# Patient Record
Sex: FEMALE | Race: WHITE | NOT HISPANIC OR LATINO | Employment: OTHER
[De-identification: names, ages, dates, MRNs, and addresses within clinical notes are randomized per-mention and may not be internally consistent; named-entity substitution may affect disease eponyms.]

---

## 2017-01-10 ENCOUNTER — PRIOR ORIGINAL RECORDS (OUTPATIENT)
Dept: OTHER | Age: 82
End: 2017-01-10

## 2017-01-10 ENCOUNTER — MYAURORA ACCOUNT LINK (OUTPATIENT)
Dept: OTHER | Age: 82
End: 2017-01-10

## 2017-01-13 ENCOUNTER — MYAURORA ACCOUNT LINK (OUTPATIENT)
Dept: OTHER | Age: 82
End: 2017-01-13

## 2017-01-26 ENCOUNTER — PRIOR ORIGINAL RECORDS (OUTPATIENT)
Dept: OTHER | Age: 82
End: 2017-01-26

## 2017-01-30 RX ORDER — EZETIMIBE AND SIMVASTATIN 10; 10 MG/1; MG/1
TABLET ORAL
Qty: 36 TABLET | Refills: 0 | Status: SHIPPED | OUTPATIENT
Start: 2017-01-30 | End: 2017-05-09

## 2017-02-07 ENCOUNTER — PRIOR ORIGINAL RECORDS (OUTPATIENT)
Dept: OTHER | Age: 82
End: 2017-02-07

## 2017-04-24 RX ORDER — EZETIMIBE AND SIMVASTATIN 10; 10 MG/1; MG/1
TABLET ORAL
Qty: 36 TABLET | OUTPATIENT
Start: 2017-04-24

## 2017-05-09 ENCOUNTER — OFFICE VISIT (OUTPATIENT)
Dept: INTERNAL MEDICINE CLINIC | Facility: CLINIC | Age: 82
End: 2017-05-09

## 2017-05-09 ENCOUNTER — HOSPITAL ENCOUNTER (OUTPATIENT)
Dept: GENERAL RADIOLOGY | Age: 82
Discharge: HOME | End: 2017-05-09
Attending: INTERNAL MEDICINE
Payer: MEDICARE

## 2017-05-09 VITALS
RESPIRATION RATE: 13 BRPM | TEMPERATURE: 98 F | HEIGHT: 62 IN | DIASTOLIC BLOOD PRESSURE: 70 MMHG | WEIGHT: 130 LBS | BODY MASS INDEX: 23.92 KG/M2 | HEART RATE: 76 BPM | OXYGEN SATURATION: 97 % | SYSTOLIC BLOOD PRESSURE: 122 MMHG

## 2017-05-09 DIAGNOSIS — M25.562 CHRONIC PAIN OF BOTH KNEES: ICD-10-CM

## 2017-05-09 DIAGNOSIS — M25.561 CHRONIC PAIN OF BOTH KNEES: ICD-10-CM

## 2017-05-09 DIAGNOSIS — H35.371 MACULAR PUCKER, RIGHT EYE: ICD-10-CM

## 2017-05-09 DIAGNOSIS — G89.29 CHRONIC PAIN OF BOTH KNEES: ICD-10-CM

## 2017-05-09 DIAGNOSIS — S06.5X9A SDH (SUBDURAL HEMATOMA) (HCC): ICD-10-CM

## 2017-05-09 DIAGNOSIS — E78.2 MIXED HYPERLIPIDEMIA: ICD-10-CM

## 2017-05-09 DIAGNOSIS — I10 ESSENTIAL HYPERTENSION: ICD-10-CM

## 2017-05-09 DIAGNOSIS — Z00.00 ENCOUNTER FOR ANNUAL HEALTH EXAMINATION: ICD-10-CM

## 2017-05-09 DIAGNOSIS — Z13.31 DEPRESSION SCREENING: ICD-10-CM

## 2017-05-09 DIAGNOSIS — Z00.00 ENCOUNTER FOR ANNUAL HEALTH EXAMINATION: Primary | ICD-10-CM

## 2017-05-09 DIAGNOSIS — K59.09 CHRONIC CONSTIPATION: ICD-10-CM

## 2017-05-09 DIAGNOSIS — I25.10 CORONARY ARTERY DISEASE INVOLVING NATIVE CORONARY ARTERY OF NATIVE HEART WITHOUT ANGINA PECTORIS: ICD-10-CM

## 2017-05-09 PROBLEM — S06.5XAA SDH (SUBDURAL HEMATOMA) (HCC): Status: ACTIVE | Noted: 2017-05-09

## 2017-05-09 PROCEDURE — 73565 X-RAY EXAM OF KNEES: CPT | Performed by: INTERNAL MEDICINE

## 2017-05-09 PROCEDURE — 99214 OFFICE O/P EST MOD 30 MIN: CPT | Performed by: INTERNAL MEDICINE

## 2017-05-09 PROCEDURE — G0439 PPPS, SUBSEQ VISIT: HCPCS | Performed by: INTERNAL MEDICINE

## 2017-05-09 PROCEDURE — G0444 DEPRESSION SCREEN ANNUAL: HCPCS | Performed by: INTERNAL MEDICINE

## 2017-05-09 RX ORDER — MELATONIN
1000 DAILY
COMMUNITY
End: 2020-12-04

## 2017-05-09 RX ORDER — OXYBUTYNIN CHLORIDE 5 MG/1
5 TABLET, EXTENDED RELEASE ORAL DAILY
Qty: 90 TABLET | Refills: 0 | Status: SHIPPED | OUTPATIENT
Start: 2017-05-09 | End: 2017-10-02

## 2017-05-09 RX ORDER — OXYBUTYNIN CHLORIDE 5 MG/1
5 TABLET, EXTENDED RELEASE ORAL DAILY
Qty: 90 TABLET | Refills: 0 | Status: SHIPPED | OUTPATIENT
Start: 2017-05-09 | End: 2017-05-09

## 2017-05-09 RX ORDER — LOSARTAN POTASSIUM 25 MG/1
25 TABLET ORAL EVERY MORNING
Status: ON HOLD | COMMUNITY
End: 2021-02-17

## 2017-05-09 NOTE — PROGRESS NOTES
HPI:   Michele Husain is a 80year old female who presents for a medicare wellness exam and additional issues as noted below. 1. CADz s/p PCI x 2: she is adamant Dr. Bibiana Velasquez told her NOT to be on ASA 81 mg daily and vytorin.  I showed her cardiology n Samuel Breaux MD.   MEDICAL INFORMATION:   She  has a past medical history of Hypercholesterolemia; Retinal tear (1983); Floaters (2010); Epiretinal membrane (2010); Other and unspecified hyperlipidemia; [facility] (2012);  High blood pressure; Cataract; anemia  ENDOCRINE: denies thyroid history  ALL/ASTHMA: denies hx of allergy or asthma    EXAM:   /70 mmHg  Pulse 76  Temp(Src) 97.7 °F (36.5 °C) (Oral)  Resp 13  Ht 62\"  Wt 130 lb  BMI 23.77 kg/m2  SpO2 97% Estimated body mass index is 23.77 kg/(m^2 General Health     In the past six months, have you lost more than 10 pounds without trying?: 2 - No    Has your appetite been poor?: No    How does the patient maintain a good energy level?: Appropriate Exercise    How would you describe your daily ph Directives     Do you have a healthcare power of ?: Yes    Do you have a living will?: Yes     Please go to \"Cognitive Assessment\" under Medicare Assessment section in Charting, test patient and document.     Then, refresh your progress note to se display for this patient. Update Health Maintenance if applicable    Chlamydia  Annually if high risk No results found for: CHLAMYDIA No flowsheet data found.     Screening Mammogram      Mammogram Annually to 76, then as discussed Mammogram,1 Yr due on 02/ VALP No flowsheet data found. Diabetes      HgbA1C  Annually HGBA1C (%)   Date Value   01/29/2016 5.7*   05/16/2012 6.0*    No flowsheet data found.     Creat/alb ratio  Annually      LDL  Annually LDL CHOLESTEROL (mg/dL)   Date Value   11/15/2016 75 POA)    Care Team:  Bethany Knight    The patient indicates understanding of these issues and agrees to the plan.   The patient is asked to return in May, 2018 for medicare wellness exam.    Jm Muhammad MD

## 2017-05-09 NOTE — PATIENT INSTRUCTIONS
Dr. Victoriano Louise (your cardiologist) WANTS you to be on Aspirin 81 mg once daily and continue the vytorin 3 times/week. I recommend you take the losartan 50 mg once every morning.      For your urinary incontinence, please STOP the detrol LA and start oxybuty bed.  STAIRS:  ? Keep stairwells well lit with light switches at top and bottom. ? Install sturdy handrails on both sides. ? Make sure carpeting is secure. FLOORS:  ? Remove all loose wires, cords and throw rugs. ? Keep floors clear of clutter. ?  Make

## 2017-08-04 ENCOUNTER — PRIOR ORIGINAL RECORDS (OUTPATIENT)
Dept: OTHER | Age: 82
End: 2017-08-04

## 2017-08-30 ENCOUNTER — OFFICE VISIT (OUTPATIENT)
Dept: OPHTHALMOLOGY | Facility: CLINIC | Age: 82
End: 2017-08-30

## 2017-08-30 DIAGNOSIS — Z96.1 PSEUDOPHAKIA OF BOTH EYES: ICD-10-CM

## 2017-08-30 DIAGNOSIS — H35.371 EPIRETINAL MEMBRANE, RIGHT EYE: Primary | ICD-10-CM

## 2017-08-30 DIAGNOSIS — H43.393 VITREOUS FLOATERS OF BOTH EYES: ICD-10-CM

## 2017-08-30 PROCEDURE — 92014 COMPRE OPH EXAM EST PT 1/>: CPT | Performed by: OPHTHALMOLOGY

## 2017-08-30 PROCEDURE — 92015 DETERMINE REFRACTIVE STATE: CPT | Performed by: OPHTHALMOLOGY

## 2017-08-30 NOTE — PROGRESS NOTES
Jerry Padilla is a 80year old female. HPI:     HPI     Pt complains of blurred vision OU at near when reading fine print. Pt would like an updated glasses Rx.      Last edited by Madison Myles O.T. on 8/30/2017  9:48 AM. (History)        Patient 20/30 -2    Near cc 20/30 20/25    Correction:  Glasses          Tonometry (Applanation, 10:01 AM)       Right Left    Pressure 12 13          Pupils       Pupils    Right PERRL    Left PERRL          Visual Fields       Left Right     Full Full          E Visit:    No prescriptions requested or ordered in this encounter     Follow up instructions:  Return in about 1 year (around 8/30/2018) for Complete eye exam.    8/30/2017  Scribed by: Peg Elizalde MD

## 2017-08-30 NOTE — PATIENT INSTRUCTIONS
Pseudophakia of both eyes  No treatment. New glasses today; suggest update. Vitreous floaters of both eyes  No treatment required. Epiretinal membrane, right eye  Stable, no treatment required. Will follow.

## 2017-09-13 ENCOUNTER — PRIOR ORIGINAL RECORDS (OUTPATIENT)
Dept: OTHER | Age: 82
End: 2017-09-13

## 2017-09-13 ENCOUNTER — OFFICE VISIT (OUTPATIENT)
Dept: INTERNAL MEDICINE CLINIC | Facility: CLINIC | Age: 82
End: 2017-09-13

## 2017-09-13 VITALS
WEIGHT: 131 LBS | TEMPERATURE: 98 F | HEIGHT: 62 IN | OXYGEN SATURATION: 97 % | DIASTOLIC BLOOD PRESSURE: 70 MMHG | RESPIRATION RATE: 16 BRPM | BODY MASS INDEX: 24.11 KG/M2 | HEART RATE: 71 BPM | SYSTOLIC BLOOD PRESSURE: 150 MMHG

## 2017-09-13 DIAGNOSIS — M79.662 BILATERAL CALF PAIN: ICD-10-CM

## 2017-09-13 DIAGNOSIS — I10 ESSENTIAL HYPERTENSION: ICD-10-CM

## 2017-09-13 DIAGNOSIS — M79.661 BILATERAL CALF PAIN: ICD-10-CM

## 2017-09-13 DIAGNOSIS — I25.10 CORONARY ARTERY DISEASE INVOLVING NATIVE CORONARY ARTERY OF NATIVE HEART WITHOUT ANGINA PECTORIS: ICD-10-CM

## 2017-09-13 DIAGNOSIS — R60.0 BILATERAL LOWER EXTREMITY EDEMA: Primary | ICD-10-CM

## 2017-09-13 DIAGNOSIS — E78.2 MIXED HYPERLIPIDEMIA: ICD-10-CM

## 2017-09-13 PROCEDURE — 99214 OFFICE O/P EST MOD 30 MIN: CPT | Performed by: INTERNAL MEDICINE

## 2017-09-13 RX ORDER — FUROSEMIDE 20 MG/1
20 TABLET ORAL DAILY
Qty: 30 TABLET | Refills: 0 | Status: SHIPPED | OUTPATIENT
Start: 2017-09-13 | End: 2018-05-07

## 2017-09-13 NOTE — PROGRESS NOTES
Lilibeth Willett is a 80year old female. HPI:   Patient presents with:  Swelling: both legs  Patient presents with bilateral leg swelling. It has been going on for past few weeks, but it worsened over past few days.   She was standing a lot for her so lens implant (Right, 6/5/00); Cataract extraction w/  intraocular lens implant (Left, 6/30/02); angioplasty (coronary) (2012); tonsillectomy; and cataract.   Family: family history includes Heart Disorder in her mother; Heart Disorder (age of onset: 61) in exertion, but no active anginal symptoms. Continue to monitor. Patient Care Team:  James Powell MD as PCP - General (Internal Medicine)  The patient indicates understanding of these issues and agrees to the plan.   The patient is asked to return to

## 2017-09-13 NOTE — PATIENT INSTRUCTIONS
- Take 1 tablet of the furosemide (water pill) every morning. Take this for the next 1-2 weeks. - Schedule the ultrasound of your legs; this will show us if you have any blood clots. - Keep your legs elevated as much as possible.   - Ok to continue r

## 2017-09-18 ENCOUNTER — HOSPITAL ENCOUNTER (OUTPATIENT)
Dept: ULTRASOUND IMAGING | Age: 82
Discharge: HOME | End: 2017-09-18
Attending: INTERNAL MEDICINE
Payer: MEDICARE

## 2017-09-18 DIAGNOSIS — R60.0 BILATERAL LOWER EXTREMITY EDEMA: ICD-10-CM

## 2017-09-18 DIAGNOSIS — M79.661 BILATERAL CALF PAIN: ICD-10-CM

## 2017-09-18 DIAGNOSIS — M79.662 BILATERAL CALF PAIN: ICD-10-CM

## 2017-09-18 PROCEDURE — 93970 EXTREMITY STUDY: CPT | Performed by: INTERNAL MEDICINE

## 2017-09-29 ENCOUNTER — PRIOR ORIGINAL RECORDS (OUTPATIENT)
Dept: OTHER | Age: 82
End: 2017-09-29

## 2017-10-02 DIAGNOSIS — M25.561 CHRONIC PAIN OF BOTH KNEES: ICD-10-CM

## 2017-10-02 DIAGNOSIS — M25.562 CHRONIC PAIN OF BOTH KNEES: ICD-10-CM

## 2017-10-02 DIAGNOSIS — Z00.00 ENCOUNTER FOR ANNUAL HEALTH EXAMINATION: ICD-10-CM

## 2017-10-02 DIAGNOSIS — G89.29 CHRONIC PAIN OF BOTH KNEES: ICD-10-CM

## 2017-10-02 DIAGNOSIS — Z13.31 DEPRESSION SCREENING: ICD-10-CM

## 2017-10-03 RX ORDER — OXYBUTYNIN CHLORIDE 5 MG/1
TABLET, EXTENDED RELEASE ORAL
Qty: 90 TABLET | Refills: 2 | Status: SHIPPED | OUTPATIENT
Start: 2017-10-03 | End: 2018-01-04

## 2017-10-08 ENCOUNTER — HOSPITAL ENCOUNTER (EMERGENCY)
Facility: HOSPITAL | Age: 82
Discharge: HOME | End: 2017-10-08
Attending: EMERGENCY MEDICINE
Payer: MEDICARE

## 2017-10-08 ENCOUNTER — PRIOR ORIGINAL RECORDS (OUTPATIENT)
Dept: OTHER | Age: 82
End: 2017-10-08

## 2017-10-08 ENCOUNTER — APPOINTMENT (OUTPATIENT)
Dept: GENERAL RADIOLOGY | Facility: HOSPITAL | Age: 82
End: 2017-10-08
Payer: MEDICARE

## 2017-10-08 VITALS
TEMPERATURE: 98 F | BODY MASS INDEX: 24.55 KG/M2 | WEIGHT: 130 LBS | OXYGEN SATURATION: 98 % | DIASTOLIC BLOOD PRESSURE: 88 MMHG | RESPIRATION RATE: 18 BRPM | HEART RATE: 75 BPM | SYSTOLIC BLOOD PRESSURE: 187 MMHG | HEIGHT: 61 IN

## 2017-10-08 DIAGNOSIS — I10 HYPERTENSION, UNSPECIFIED TYPE: Primary | ICD-10-CM

## 2017-10-08 PROCEDURE — 36415 COLL VENOUS BLD VENIPUNCTURE: CPT

## 2017-10-08 PROCEDURE — 80053 COMPREHEN METABOLIC PANEL: CPT

## 2017-10-08 PROCEDURE — 85025 COMPLETE CBC W/AUTO DIFF WBC: CPT | Performed by: EMERGENCY MEDICINE

## 2017-10-08 PROCEDURE — 85025 COMPLETE CBC W/AUTO DIFF WBC: CPT

## 2017-10-08 PROCEDURE — 99285 EMERGENCY DEPT VISIT HI MDM: CPT

## 2017-10-08 PROCEDURE — 93005 ELECTROCARDIOGRAM TRACING: CPT

## 2017-10-08 PROCEDURE — 80053 COMPREHEN METABOLIC PANEL: CPT | Performed by: EMERGENCY MEDICINE

## 2017-10-08 PROCEDURE — 93010 ELECTROCARDIOGRAM REPORT: CPT

## 2017-10-08 PROCEDURE — 71010 XR CHEST AP PORTABLE  (CPT=71010): CPT

## 2017-10-08 PROCEDURE — 71010 XR CHEST AP PORTABLE  (CPT=71010): CPT | Performed by: EMERGENCY MEDICINE

## 2017-10-08 PROCEDURE — 84484 ASSAY OF TROPONIN QUANT: CPT | Performed by: EMERGENCY MEDICINE

## 2017-10-08 PROCEDURE — 84484 ASSAY OF TROPONIN QUANT: CPT

## 2017-10-08 RX ORDER — LOSARTAN POTASSIUM 50 MG/1
50 TABLET ORAL 2 TIMES DAILY
Qty: 60 TABLET | Refills: 0 | Status: SHIPPED | OUTPATIENT
Start: 2017-10-08 | End: 2017-11-07

## 2017-10-08 RX ORDER — EZETIMIBE AND SIMVASTATIN 10; 10 MG/1; MG/1
1 TABLET ORAL NIGHTLY
COMMUNITY
End: 2018-05-07

## 2017-10-08 NOTE — ED INITIAL ASSESSMENT (HPI)
Pt to ER c/o shortness of breath and high blood pressure today. Pt states she has tired but can't sleep.

## 2017-10-09 NOTE — ED PROVIDER NOTES
Patient Seen in: [facility] Emergency Department    History   Patient presents with:  Hypertension (cardiovascular)    Stated Complaint: hypertension    HPI    59-year-old female presents with elevated blood pressure.   Patient reports she was at [facility] date: TONSILLECTOMY    Family History   Problem Relation Age of Onset   • Heart Disorder Mother    • Heart Disorder Son 61     MI    • Diabetes Neg    • Glaucoma Neg    • Macular degeneration Neg        Smoking status: Never Smoker Status                     ---------                               -----------         ------                     CBC W/ DIFFERENTIAL[[id number]]          Normal              Final result                 Please view results for these tests on the individ TECHNIQUE:  AP chest radiograph was obtained. COMPARISON:  [facility] , BERKLEY CHEST AP PORTABLE  (CPT=71010), 11/14/2016, 23:24.   INDICATIONS:  hypertension  PATIENT STATED HISTORY: (As transcribed by Technologist)  Hypertension, dyspnea and lower chest tightnes Impression:  Hypertension, unspecified type  (primary encounter diagnosis)    Disposition:  Discharge    Follow-up:  Nirmal Velarde MD  [address] [facility] [phone]    Schedule an appointment as soon as possible for a

## 2017-10-13 ENCOUNTER — MYAURORA ACCOUNT LINK (OUTPATIENT)
Dept: OTHER | Age: 82
End: 2017-10-13

## 2017-10-13 ENCOUNTER — PRIOR ORIGINAL RECORDS (OUTPATIENT)
Dept: OTHER | Age: 82
End: 2017-10-13

## 2017-10-24 LAB
ALBUMIN: 3.4 G/DL
ALKALINE PHOSPHATATE(ALK PHOS): 83 IU/L
BILIRUBIN TOTAL: 0.4 MG/DL
BUN: 18 MG/DL
CALCIUM: 8.7 MG/DL
CHLORIDE: 104 MEQ/L
CREATININE, SERUM: 0.74 MG/DL
GLUCOSE: 153 MG/DL
HEMATOCRIT: 38.8 %
HEMOGLOBIN: 13 G/DL
PLATELETS: 236 K/UL
POTASSIUM, SERUM: 3.8 MEQ/L
PROTEIN, TOTAL: 6.3 G/DL
RED BLOOD COUNT: 4.3 X 10-6/U
SGOT (AST): 17 IU/L
SGPT (ALT): 20 IU/L
SODIUM: 137 MEQ/L
WHITE BLOOD COUNT: 6.4 X 10-3/U

## 2017-10-31 ENCOUNTER — PRIOR ORIGINAL RECORDS (OUTPATIENT)
Dept: OTHER | Age: 82
End: 2017-10-31

## 2017-11-06 ENCOUNTER — HOSPITAL ENCOUNTER (OUTPATIENT)
Age: 82
Discharge: HOME | End: 2017-11-06
Attending: FAMILY MEDICINE
Payer: MEDICARE

## 2017-11-06 ENCOUNTER — APPOINTMENT (OUTPATIENT)
Dept: CT IMAGING | Age: 82
End: 2017-11-06
Attending: FAMILY MEDICINE
Payer: MEDICARE

## 2017-11-06 VITALS
SYSTOLIC BLOOD PRESSURE: 168 MMHG | RESPIRATION RATE: 16 BRPM | HEART RATE: 88 BPM | OXYGEN SATURATION: 97 % | TEMPERATURE: 99 F | DIASTOLIC BLOOD PRESSURE: 82 MMHG

## 2017-11-06 DIAGNOSIS — S00.83XA CONTUSION OF OTHER PART OF HEAD, INITIAL ENCOUNTER: Primary | ICD-10-CM

## 2017-11-06 DIAGNOSIS — S80.01XA CONTUSION OF RIGHT KNEE, INITIAL ENCOUNTER: ICD-10-CM

## 2017-11-06 PROCEDURE — 99214 OFFICE O/P EST MOD 30 MIN: CPT

## 2017-11-06 PROCEDURE — 99204 OFFICE O/P NEW MOD 45 MIN: CPT

## 2017-11-06 PROCEDURE — 70450 CT HEAD/BRAIN W/O DYE: CPT | Performed by: FAMILY MEDICINE

## 2017-11-06 NOTE — ED INITIAL ASSESSMENT (HPI)
Here for evaluation of head injury that occurred about 2pm. Sts that she had fallen in her kitchen when she was turning around. Denies any LOC. Hematoma noted to head and inner lip lac noted.

## 2017-11-07 NOTE — ED PROVIDER NOTES
Patient Seen in: [facility]    History   Patient presents with:  Fall (musculoskeletal, neurologic)    Stated Complaint: fall     HPI    80year old female presents for head injury after mechanical fall.  States around 2 pm, she except as noted above. PSFH elements reviewed from today and agreed except as otherwise stated in HPI.     Physical Exam   ED Triage Vitals [11/06/17 1754]  BP: (!) 199/74  Pulse: 88  Resp: 16  Temp: 99.1 °F (37.3 °C)  Temp src: Temporal  SpO2: 97 %  O2 global volume loss is overall age-appropriate. INTRACRANIAL:  Overall mild periventricular hypoattenuation is present. This is stable to slightly progressed from the prior exam. No significant midline shift or mass effect.  SINUSES:           No sign of acu

## 2017-11-09 ENCOUNTER — OFFICE VISIT (OUTPATIENT)
Dept: INTERNAL MEDICINE CLINIC | Facility: CLINIC | Age: 82
End: 2017-11-09

## 2017-11-09 VITALS
HEIGHT: 62 IN | OXYGEN SATURATION: 97 % | RESPIRATION RATE: 13 BRPM | DIASTOLIC BLOOD PRESSURE: 82 MMHG | WEIGHT: 130 LBS | BODY MASS INDEX: 23.92 KG/M2 | HEART RATE: 70 BPM | TEMPERATURE: 98 F | SYSTOLIC BLOOD PRESSURE: 142 MMHG

## 2017-11-09 DIAGNOSIS — S00.83XA CONTUSION OF FACE, INITIAL ENCOUNTER: ICD-10-CM

## 2017-11-09 DIAGNOSIS — E78.2 MIXED HYPERLIPIDEMIA: ICD-10-CM

## 2017-11-09 DIAGNOSIS — Z91.81 STATUS POST FALL: Primary | ICD-10-CM

## 2017-11-09 DIAGNOSIS — Z96.1 PSEUDOPHAKIA OF BOTH EYES: ICD-10-CM

## 2017-11-09 DIAGNOSIS — I10 ESSENTIAL HYPERTENSION: ICD-10-CM

## 2017-11-09 PROCEDURE — 99214 OFFICE O/P EST MOD 30 MIN: CPT | Performed by: INTERNAL MEDICINE

## 2017-11-09 NOTE — PROGRESS NOTES
Gracy Moe is a 80year old female. HPI:   Patient presents with: Follow - Up  Fall  Patient presents for follow up from recent immediate care visit (11/6/17).   She was wearing athletic shoes, turned around quickly in the kitchen, foot slipped on (2010); Floaters (2010); Heart attack (2012); High blood pressure; Hypercholesterolemia; Muscle weakness; Osteoarthritis; Other and unspecified hyperlipidemia; and Retinal tear (1983).   Surgical:  has a past surgical history that includes Cataract extracti syndrome. CT head done at immediate care did not show any acute findings (patient did have a subdural hematoma in 2012). Ecchymosis on face is slowly re-absorbing. Continue to monitor.       3.  Essential hypertension  Stable, at goal.  Continue losartan

## 2017-11-09 NOTE — PATIENT INSTRUCTIONS
- Let us know if you have severe headaches, vision problems, nausea/vomiting  - Your bruising should heal/re-absorb within the next few weeks. - Use Tylenol and ice packs as needed. It was a pleasure seeing you in the clinic today.   Thank you for kizzy

## 2018-01-04 DIAGNOSIS — Z13.31 DEPRESSION SCREENING: ICD-10-CM

## 2018-01-04 DIAGNOSIS — Z00.00 ENCOUNTER FOR ANNUAL HEALTH EXAMINATION: ICD-10-CM

## 2018-01-04 DIAGNOSIS — M25.562 CHRONIC PAIN OF BOTH KNEES: ICD-10-CM

## 2018-01-04 DIAGNOSIS — G89.29 CHRONIC PAIN OF BOTH KNEES: ICD-10-CM

## 2018-01-04 DIAGNOSIS — M25.561 CHRONIC PAIN OF BOTH KNEES: ICD-10-CM

## 2018-01-04 RX ORDER — OXYBUTYNIN CHLORIDE 5 MG/1
5 TABLET, EXTENDED RELEASE ORAL
Qty: 90 TABLET | Refills: 1 | Status: SHIPPED | OUTPATIENT
Start: 2018-01-04 | End: 2018-08-29

## 2018-01-04 NOTE — TELEPHONE ENCOUNTER
Patient calling to request refill for OXYBUTYNIN CHLORIDE ER 5 Mg.  Patient needs It sent to [facility] in [place] and also needs 3 month supply

## 2018-02-06 ENCOUNTER — PRIOR ORIGINAL RECORDS (OUTPATIENT)
Dept: OTHER | Age: 83
End: 2018-02-06

## 2018-02-06 ENCOUNTER — MYAURORA ACCOUNT LINK (OUTPATIENT)
Dept: OTHER | Age: 83
End: 2018-02-06

## 2018-02-22 ENCOUNTER — PRIOR ORIGINAL RECORDS (OUTPATIENT)
Dept: OTHER | Age: 83
End: 2018-02-22

## 2018-02-23 ENCOUNTER — PRIOR ORIGINAL RECORDS (OUTPATIENT)
Dept: OTHER | Age: 83
End: 2018-02-23

## 2018-03-05 ENCOUNTER — PRIOR ORIGINAL RECORDS (OUTPATIENT)
Dept: OTHER | Age: 83
End: 2018-03-05

## 2018-03-07 ENCOUNTER — PRIOR ORIGINAL RECORDS (OUTPATIENT)
Dept: OTHER | Age: 83
End: 2018-03-07

## 2018-04-03 ENCOUNTER — PRIOR ORIGINAL RECORDS (OUTPATIENT)
Dept: OTHER | Age: 83
End: 2018-04-03

## 2018-04-04 ENCOUNTER — PRIOR ORIGINAL RECORDS (OUTPATIENT)
Dept: OTHER | Age: 83
End: 2018-04-04

## 2018-04-18 ENCOUNTER — TELEPHONE (OUTPATIENT)
Dept: INTERNAL MEDICINE CLINIC | Facility: CLINIC | Age: 83
End: 2018-04-18

## 2018-05-07 ENCOUNTER — OFFICE VISIT (OUTPATIENT)
Dept: INTERNAL MEDICINE CLINIC | Facility: CLINIC | Age: 83
End: 2018-05-07

## 2018-05-07 VITALS
SYSTOLIC BLOOD PRESSURE: 128 MMHG | HEIGHT: 60 IN | HEART RATE: 70 BPM | DIASTOLIC BLOOD PRESSURE: 86 MMHG | TEMPERATURE: 98 F | BODY MASS INDEX: 25.97 KG/M2 | WEIGHT: 132.25 LBS | RESPIRATION RATE: 20 BRPM

## 2018-05-07 DIAGNOSIS — E78.2 MIXED HYPERLIPIDEMIA: ICD-10-CM

## 2018-05-07 DIAGNOSIS — H35.371 EPIRETINAL MEMBRANE, RIGHT EYE: ICD-10-CM

## 2018-05-07 DIAGNOSIS — Z00.00 MEDICARE ANNUAL WELLNESS VISIT, SUBSEQUENT: Primary | ICD-10-CM

## 2018-05-07 DIAGNOSIS — M25.472 BILATERAL SWELLING OF FEET AND ANKLES: ICD-10-CM

## 2018-05-07 DIAGNOSIS — R32 URINARY INCONTINENCE, UNSPECIFIED TYPE: ICD-10-CM

## 2018-05-07 DIAGNOSIS — M25.475 BILATERAL SWELLING OF FEET AND ANKLES: ICD-10-CM

## 2018-05-07 DIAGNOSIS — H43.393 VITREOUS FLOATERS OF BOTH EYES: ICD-10-CM

## 2018-05-07 DIAGNOSIS — M25.474 BILATERAL SWELLING OF FEET AND ANKLES: ICD-10-CM

## 2018-05-07 DIAGNOSIS — M17.0 BILATERAL PRIMARY OSTEOARTHRITIS OF KNEE: ICD-10-CM

## 2018-05-07 DIAGNOSIS — I10 ESSENTIAL HYPERTENSION: ICD-10-CM

## 2018-05-07 DIAGNOSIS — Z96.1 PSEUDOPHAKIA OF BOTH EYES: ICD-10-CM

## 2018-05-07 DIAGNOSIS — I25.10 CORONARY ARTERY DISEASE INVOLVING NATIVE CORONARY ARTERY OF NATIVE HEART WITHOUT ANGINA PECTORIS: ICD-10-CM

## 2018-05-07 DIAGNOSIS — M25.471 BILATERAL SWELLING OF FEET AND ANKLES: ICD-10-CM

## 2018-05-07 PROBLEM — S06.5X9A SDH (SUBDURAL HEMATOMA) (HCC): Status: RESOLVED | Noted: 2017-05-09 | Resolved: 2018-05-07

## 2018-05-07 PROBLEM — S06.5XAA SDH (SUBDURAL HEMATOMA) (HCC): Status: RESOLVED | Noted: 2017-05-09 | Resolved: 2018-05-07

## 2018-05-07 PROCEDURE — G0439 PPPS, SUBSEQ VISIT: HCPCS | Performed by: INTERNAL MEDICINE

## 2018-05-07 PROCEDURE — 99214 OFFICE O/P EST MOD 30 MIN: CPT | Performed by: INTERNAL MEDICINE

## 2018-05-07 NOTE — PROGRESS NOTES
HPI:   Danny Fletcher is a 80year old female who presents for a Medicare Subsequent Annual Wellness visit (Pt already had Initial Annual Wellness).   Annual Physical due on 05/09/2018   Preventative health/wellness examination - up to date as far as immun She has Vision problems based on screening of functional status. Vision Problems? : Yes (glasses)         She has problems with Memory based on screening of functional status.    Memory Problems?: Yes       Depression Screening (PHQ-2/PHQ-9): Over the Lab Results  Component Value Date   AST 17 10/08/2017   ALT 20 10/08/2017   CA 8.7 10/08/2017   ALB 3.4 (L) 10/08/2017   TSH 1.380 10/01/2013   CREATSERUM 0.74 10/08/2017    (H) 10/08/2017        CBC  (most recent labs)     Lab Results  Componen headaches  PSYCHE: denies depression or anxiety  HEMATOLOGIC: denies hx of anemia  ENDOCRINE: denies thyroid history  ALL/ASTHMA: denies hx of allergy or asthma  EXAM:   /86 (BP Location: Left arm, Patient Position: Sitting, Cuff Size: adult)   Pulse be put on medications. 4/5. Coronary artery disease involving native coronary artery of native heart without angina pectoris, bilateral swelling of feet and ankles  As far as CAD, she is asymptomatic, no anginal symptoms. Declines asa as above.   On This section provided for quick review of chart, separate sheet to patient  [address] Internal Lab or Procedure External Lab or Procedure   Diabetes Screening      HbgA1C   Annually   Lab Results  Component Value Date Influenza  Covered Annually  Please get every year    Pneumococcal 13 (Prevnar)  Covered Once after 65 No vaccine history found Please get once after your 65th birthday    Pneumococcal 23 (Pneumovax)  Covered Once after 65 No vaccine history found Jameson

## 2018-05-07 NOTE — PATIENT INSTRUCTIONS
- We will contact you when we are ready for you to schedule the appointment for the knee injections  - If you continue to have blurry vision with your glasses on, follow up with Dr. Flor Powell; otherwise he wanted to see you in August.    - We will get recor

## 2018-05-08 PROBLEM — M17.0 BILATERAL PRIMARY OSTEOARTHRITIS OF KNEE: Status: ACTIVE | Noted: 2018-05-08

## 2018-05-08 PROBLEM — R32 URINARY INCONTINENCE: Status: ACTIVE | Noted: 2018-05-08

## 2018-05-14 ENCOUNTER — TELEPHONE (OUTPATIENT)
Dept: INTERNAL MEDICINE CLINIC | Facility: CLINIC | Age: 83
End: 2018-05-14

## 2018-05-16 ENCOUNTER — PRIOR ORIGINAL RECORDS (OUTPATIENT)
Dept: OTHER | Age: 83
End: 2018-05-16

## 2018-05-16 ENCOUNTER — TELEPHONE (OUTPATIENT)
Dept: INTERNAL MEDICINE CLINIC | Facility: CLINIC | Age: 83
End: 2018-05-16

## 2018-05-16 NOTE — TELEPHONE ENCOUNTER
Patient was interested in seeing Briana Dahl for steroid injections in knees. Had bilateral x-rays in 2017. Knee pain worsening. Please contact patient when ready to schedule appointment with Beatriz/when cortisone is available in office.

## 2018-05-21 ENCOUNTER — LABORATORY ENCOUNTER (OUTPATIENT)
Dept: LAB | Age: 83
End: 2018-05-21
Attending: INTERNAL MEDICINE
Payer: MEDICARE

## 2018-05-21 ENCOUNTER — OFFICE VISIT (OUTPATIENT)
Dept: INTERNAL MEDICINE CLINIC | Facility: CLINIC | Age: 83
End: 2018-05-21

## 2018-05-21 VITALS
OXYGEN SATURATION: 95 % | DIASTOLIC BLOOD PRESSURE: 86 MMHG | WEIGHT: 131 LBS | BODY MASS INDEX: 25.72 KG/M2 | HEART RATE: 102 BPM | RESPIRATION RATE: 18 BRPM | TEMPERATURE: 98 F | HEIGHT: 60 IN | SYSTOLIC BLOOD PRESSURE: 124 MMHG

## 2018-05-21 DIAGNOSIS — I10 ESSENTIAL HYPERTENSION: ICD-10-CM

## 2018-05-21 DIAGNOSIS — E78.2 MIXED HYPERLIPIDEMIA: ICD-10-CM

## 2018-05-21 DIAGNOSIS — I25.10 CORONARY ARTERY DISEASE INVOLVING NATIVE CORONARY ARTERY OF NATIVE HEART WITHOUT ANGINA PECTORIS: ICD-10-CM

## 2018-05-21 DIAGNOSIS — M17.0 BILATERAL PRIMARY OSTEOARTHRITIS OF KNEE: Primary | ICD-10-CM

## 2018-05-21 PROCEDURE — 99213 OFFICE O/P EST LOW 20 MIN: CPT | Performed by: PHYSICIAN ASSISTANT

## 2018-05-21 PROCEDURE — 80061 LIPID PANEL: CPT

## 2018-05-21 PROCEDURE — 80053 COMPREHEN METABOLIC PANEL: CPT

## 2018-05-21 PROCEDURE — 36415 COLL VENOUS BLD VENIPUNCTURE: CPT

## 2018-05-21 PROCEDURE — 20610 DRAIN/INJ JOINT/BURSA W/O US: CPT | Performed by: PHYSICIAN ASSISTANT

## 2018-05-21 PROCEDURE — 85025 COMPLETE CBC W/AUTO DIFF WBC: CPT

## 2018-05-21 RX ORDER — TRIAMCINOLONE ACETONIDE 40 MG/ML
40 INJECTION, SUSPENSION INTRA-ARTICULAR; INTRAMUSCULAR ONCE
Status: COMPLETED | OUTPATIENT
Start: 2018-05-21 | End: 2018-05-21

## 2018-05-21 RX ORDER — LOSARTAN POTASSIUM 25 MG/1
50 TABLET ORAL NIGHTLY
Status: ON HOLD | COMMUNITY
Start: 2018-05-08 | End: 2021-02-17

## 2018-05-21 RX ADMIN — TRIAMCINOLONE ACETONIDE 40 MG: 40 INJECTION, SUSPENSION INTRA-ARTICULAR; INTRAMUSCULAR at 11:24:00

## 2018-05-21 RX ADMIN — TRIAMCINOLONE ACETONIDE 40 MG: 40 INJECTION, SUSPENSION INTRA-ARTICULAR; INTRAMUSCULAR at 11:25:00

## 2018-05-21 NOTE — PATIENT INSTRUCTIONS
Corticosteroid (“Cortisone”) Knee Injections  Instructions Following Injections    *you may try over the counter topical agents such as IcyHot, Salonpas, Biofreeze as needed.     The Steroid  Corticosteroids work by reducing inflammation directly in the [employer] notify the office right away. If you experience any reaction to the rest of your body such as swelling, rashes, chest tightness, shortness of breath, difficulty breathing you should seek medical attention immediately. [facility] * [address]

## 2018-05-21 NOTE — PROGRESS NOTES
Jalyn Chawla is a 80year old female. HPI:   Patient presents for bilat knee pain x several years. Pain over the anterior aspect of the knees. No recent injury but she has had several falls over the years which tend to flare up her knee pain.   Nadine Flower +PF/DF. DP & PT pulses present bilaterally. ASSESSMENT AND PLAN:   # Bilat knee OA: discussed the use of ice, tylenol, activity modification, corticosteroid vs hyaluronic acid injections, and HEP program vs formal PT.   Pt wishes to pursue bilat cortico

## 2018-05-23 ENCOUNTER — TELEPHONE (OUTPATIENT)
Dept: INTERNAL MEDICINE CLINIC | Facility: CLINIC | Age: 83
End: 2018-05-23

## 2018-05-23 ENCOUNTER — PRIOR ORIGINAL RECORDS (OUTPATIENT)
Dept: OTHER | Age: 83
End: 2018-05-23

## 2018-05-23 RX ORDER — FUROSEMIDE 20 MG/1
20 TABLET ORAL DAILY
Qty: 30 TABLET | Refills: 0 | Status: ON HOLD | OUTPATIENT
Start: 2018-05-23 | End: 2018-08-15

## 2018-05-23 NOTE — TELEPHONE ENCOUNTER
Spoke to Preeti re: st for lasix, since kidney function ok , st would be ok monitor b/p see test result

## 2018-05-23 NOTE — TELEPHONE ENCOUNTER
If patient is still having leg swelling - I can send in a short-term prescription for furosemide (20 mg tablets).   Would recommend taking this for 2-4 weeks at most.

## 2018-05-27 ENCOUNTER — APPOINTMENT (OUTPATIENT)
Dept: GENERAL RADIOLOGY | Age: 83
End: 2018-05-27
Attending: FAMILY MEDICINE
Payer: MEDICARE

## 2018-05-27 ENCOUNTER — HOSPITAL ENCOUNTER (OUTPATIENT)
Age: 83
Discharge: HOME | End: 2018-05-27
Attending: FAMILY MEDICINE
Payer: MEDICARE

## 2018-05-27 ENCOUNTER — APPOINTMENT (OUTPATIENT)
Dept: CT IMAGING | Age: 83
End: 2018-05-27
Attending: FAMILY MEDICINE
Payer: MEDICARE

## 2018-05-27 VITALS
RESPIRATION RATE: 20 BRPM | HEART RATE: 67 BPM | DIASTOLIC BLOOD PRESSURE: 64 MMHG | SYSTOLIC BLOOD PRESSURE: 152 MMHG | OXYGEN SATURATION: 98 %

## 2018-05-27 DIAGNOSIS — S89.91XA INJURY OF RIGHT KNEE, INITIAL ENCOUNTER: ICD-10-CM

## 2018-05-27 DIAGNOSIS — S00.03XA HEMATOMA OF SCALP, INITIAL ENCOUNTER: ICD-10-CM
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
## 2018-05-27 DIAGNOSIS — S40.011A CONTUSION OF RIGHT SHOULDER, INITIAL ENCOUNTER: ICD-10-CM

## 2018-05-27 DIAGNOSIS — S60.221A CONTUSION OF RIGHT HAND, INITIAL ENCOUNTER: ICD-10-CM

## 2018-05-27 DIAGNOSIS — S09.90XA CLOSED HEAD INJURY, INITIAL ENCOUNTER: ICD-10-CM

## 2018-05-27 PROCEDURE — 84484 ASSAY OF TROPONIN QUANT: CPT

## 2018-05-27 PROCEDURE — 93005 ELECTROCARDIOGRAM TRACING: CPT

## 2018-05-27 PROCEDURE — 93010 ELECTROCARDIOGRAM REPORT: CPT

## 2018-05-27 PROCEDURE — 73030 X-RAY EXAM OF SHOULDER: CPT | Performed by: FAMILY MEDICINE

## 2018-05-27 PROCEDURE — 36415 COLL VENOUS BLD VENIPUNCTURE: CPT

## 2018-05-27 PROCEDURE — 99215 OFFICE O/P EST HI 40 MIN: CPT

## 2018-05-27 PROCEDURE — 73560 X-RAY EXAM OF KNEE 1 OR 2: CPT | Performed by: FAMILY MEDICINE

## 2018-05-27 PROCEDURE — 70450 CT HEAD/BRAIN W/O DYE: CPT | Performed by: FAMILY MEDICINE

## 2018-05-27 PROCEDURE — 73130 X-RAY EXAM OF HAND: CPT | Performed by: FAMILY MEDICINE

## 2018-05-27 NOTE — ED PROVIDER NOTES
Patient Seen in: [address] [facility]    History   Patient presents with:  Fall    Stated Complaint: RIGHT HAND SWOLLEN/PT Irene Vicente    HPI  71-year-old female presents to the immediate care today with her daughter chief Diagnosis Date   • Cataract    • Floaters 2010    per ng OD   • Heart attack ([address] 2012    PCI x 2   • Muscle weakness    • Osteoarthritis     bilat knees   • Retinal tear 1983    per ng OD retinal tear repair with Dr. Doris Richard at [facility]       Past Surgical H normal. No drainage or sinus tenderness. No signs of epistaxis or dried blood in nares.  No nasal bone tenderness  Throat: lips, mucosa, and tongue normal; teeth and gums normal  Neck: no adenopathy, supple, symmetrical, trachea midline and thyroid not enla EKG    Rate, intervals and axes as noted on EKG Report. Rate: 68  Rhythm: Sinus Rhythm  Reading: Normal sinus rhythm. Normal EKG.            Xr Knee (1 Or 2 Views), Right (cpt=73560)    Result Date: 5/27/2018  PROCEDURE:  XR KNEE (1 OR 2 VIEWS), RIGHT by: John Jimenez MD on 5/27/2018 at 11:23     Approved by: John Jimenez MD            Xr Shoulder, Complete (min 2 Views), Right (cpt=73030)    Result Date: 5/27/2018  PROCEDURE:  XR SHOULDER, COMPLETE (MIN 2 VIEWS), RIGHT (CPT=73030)     TECHNIQUE: no acute intracranial hemorrhage or extra-axial fluid collection. No evidence of acute territorial infarction. Stable mild to moderate chronic microvascular ischemic changes in the cerebral white matter. Stable old lacunar infarcts in the basal ganglia.

## 2018-05-27 NOTE — ED INITIAL ASSESSMENT (HPI)
C/O right hand pain and head injury that occurred while walking out to get the mail last night. Sts that she fell on the stairs outside of her house. Unsure of any LOC.

## 2018-08-14 ENCOUNTER — APPOINTMENT (OUTPATIENT)
Dept: GENERAL RADIOLOGY | Facility: HOSPITAL | Age: 83
End: 2018-08-14
Attending: EMERGENCY MEDICINE
Payer: MEDICARE

## 2018-08-14 ENCOUNTER — HOSPITAL ENCOUNTER (OUTPATIENT)
Facility: HOSPITAL | Age: 83
Setting detail: OBSERVATION
Discharge: HOME | End: 2018-08-15
Attending: EMERGENCY MEDICINE | Admitting: HOSPITALIST
Payer: MEDICARE

## 2018-08-14 ENCOUNTER — APPOINTMENT (OUTPATIENT)
Dept: CT IMAGING | Facility: HOSPITAL | Age: 83
End: 2018-08-14
Attending: EMERGENCY MEDICINE
Payer: MEDICARE

## 2018-08-14 ENCOUNTER — APPOINTMENT (OUTPATIENT)
Dept: ULTRASOUND IMAGING | Facility: HOSPITAL | Age: 83
End: 2018-08-14
Attending: INTERNAL MEDICINE
Payer: MEDICARE

## 2018-08-14 ENCOUNTER — APPOINTMENT (OUTPATIENT)
Dept: CV DIAGNOSTICS | Facility: HOSPITAL | Age: 83
End: 2018-08-14
Attending: INTERNAL MEDICINE
Payer: MEDICARE

## 2018-08-14 DIAGNOSIS — S00.03XA CONTUSION OF SCALP, INITIAL ENCOUNTER: ICD-10-CM

## 2018-08-14 DIAGNOSIS — R55 SYNCOPE AND COLLAPSE: Primary | ICD-10-CM

## 2018-08-14 PROBLEM — I77.9 CAROTID DISEASE, BILATERAL (HCC): Status: ACTIVE | Noted: 2018-08-14
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
## 2018-08-14 LAB
ALBUMIN SERPL-MCNC: 3.6 G/DL (ref 3.5–4.8)
ALBUMIN/GLOB SERPL: 1.3 {RATIO} (ref 1–2)
ALP LIVER SERPL-CCNC: 81 U/L (ref 55–142)
ALT SERPL-CCNC: 17 U/L (ref 14–54)
ANION GAP SERPL CALC-SCNC: 9 MMOL/L (ref 0–18)
AST SERPL-CCNC: 17 U/L (ref 15–41)
ATRIAL RATE: 70 BPM
BASOPHILS # BLD AUTO: 0.03 X10(3) UL (ref 0–0.1)
BASOPHILS NFR BLD AUTO: 0.5 %
BILIRUB SERPL-MCNC: 0.6 MG/DL (ref 0.1–2)
BILIRUB UR QL STRIP.AUTO: NEGATIVE
BUN BLD-MCNC: 15 MG/DL (ref 8–20)
BUN/CREAT SERPL: 17.4 (ref 10–20)
CALCIUM BLD-MCNC: 8.8 MG/DL (ref 8.3–10.3)
CHLORIDE SERPL-SCNC: 102 MMOL/L (ref 101–111)
CLARITY UR REFRACT.AUTO: CLEAR
CO2 SERPL-SCNC: 28 MMOL/L (ref 22–32)
COLOR UR AUTO: YELLOW
CREAT BLD-MCNC: 0.86 MG/DL (ref 0.55–1.02)
EOSINOPHIL # BLD AUTO: 0.13 X10(3) UL (ref 0–0.3)
EOSINOPHIL NFR BLD AUTO: 2 %
ERYTHROCYTE [DISTWIDTH] IN BLOOD BY AUTOMATED COUNT: 13.2 % (ref 11.5–16)
GLOBULIN PLAS-MCNC: 2.7 G/DL (ref 2.5–3.7)
GLUCOSE BLD-MCNC: 121 MG/DL (ref 70–99)
GLUCOSE UR STRIP.AUTO-MCNC: NEGATIVE MG/DL
HCT VFR BLD AUTO: 39.7 % (ref 34–50)
HGB BLD-MCNC: 13.3 G/DL (ref 12–16)
IMMATURE GRANULOCYTE COUNT: 0.01 X10(3) UL (ref 0–1)
IMMATURE GRANULOCYTE RATIO %: 0.2 %
INR BLD: 1.01 (ref 0.9–1.1)
KETONES UR STRIP.AUTO-MCNC: NEGATIVE MG/DL
LEUKOCYTE ESTERASE UR QL STRIP.AUTO: NEGATIVE
LYMPHOCYTES # BLD AUTO: 3.04 X10(3) UL (ref 0.9–4)
LYMPHOCYTES NFR BLD AUTO: 47 %
M PROTEIN MFR SERPL ELPH: 6.3 G/DL (ref 6.1–8.3)
MCH RBC QN AUTO: 30.4 PG (ref 27–33.2)
MCHC RBC AUTO-ENTMCNC: 33.5 G/DL (ref 31–37)
MCV RBC AUTO: 90.6 FL (ref 81–100)
MONOCYTES # BLD AUTO: 0.53 X10(3) UL (ref 0.1–1)
MONOCYTES NFR BLD AUTO: 8.2 %
NEUTROPHIL ABS PRELIM: 2.73 X10 (3) UL (ref 1.3–6.7)
NEUTROPHILS # BLD AUTO: 2.73 X10(3) UL (ref 1.3–6.7)
NEUTROPHILS NFR BLD AUTO: 42.1 %
NITRITE UR QL STRIP.AUTO: NEGATIVE
OSMOLALITY SERPL CALC.SUM OF ELEC: 290 MOSM/KG (ref 275–295)
P AXIS: 58 DEGREES
P-R INTERVAL: 164 MS
PH UR STRIP.AUTO: 7 [PH] (ref 4.5–8)
PLATELET # BLD AUTO: 246 10(3)UL (ref 150–450)
POTASSIUM SERPL-SCNC: 3.7 MMOL/L (ref 3.6–5.1)
PROT UR STRIP.AUTO-MCNC: NEGATIVE MG/DL
PSA SERPL DL<=0.01 NG/ML-MCNC: 13.1 SECONDS (ref 12–14.1)
Q-T INTERVAL: 410 MS
QRS DURATION: 88 MS
QTC CALCULATION (BEZET): 442 MS
R AXIS: 24 DEGREES
RBC # BLD AUTO: 4.38 X10(6)UL (ref 3.8–5.1)
RBC UR QL AUTO: NEGATIVE
RED CELL DISTRIBUTION WIDTH-SD: 44.2 FL (ref 35.1–46.3)
SODIUM SERPL-SCNC: 139 MMOL/L (ref 136–144)
SP GR UR STRIP.AUTO: 1.01 (ref 1–1.03)
T AXIS: 74 DEGREES
TROPONIN I SERPL-MCNC: <0.046 NG/ML (ref ?–0.05)
UROBILINOGEN UR STRIP.AUTO-MCNC: <2 MG/DL
VENTRICULAR RATE: 70 BPM
WBC # BLD AUTO: 6.5 X10(3) UL (ref 4–13)

## 2018-08-14 PROCEDURE — 72125 CT NECK SPINE W/O DYE: CPT | Performed by: EMERGENCY MEDICINE

## 2018-08-14 PROCEDURE — 93880 EXTRACRANIAL BILAT STUDY: CPT | Performed by: INTERNAL MEDICINE

## 2018-08-14 PROCEDURE — 70450 CT HEAD/BRAIN W/O DYE: CPT | Performed by: EMERGENCY MEDICINE

## 2018-08-14 PROCEDURE — 71045 X-RAY EXAM CHEST 1 VIEW: CPT | Performed by: EMERGENCY MEDICINE

## 2018-08-14 PROCEDURE — 99220 INITIAL OBSERVATION CARE,LEVL III: CPT | Performed by: HOSPITALIST

## 2018-08-14 PROCEDURE — 99204 OFFICE O/P NEW MOD 45 MIN: CPT | Performed by: OTHER

## 2018-08-14 RX ORDER — SODIUM CHLORIDE 9 MG/ML
125 INJECTION, SOLUTION INTRAVENOUS CONTINUOUS
Status: DISCONTINUED | OUTPATIENT
Start: 2018-08-14 | End: 2018-08-15

## 2018-08-14 RX ORDER — ENOXAPARIN SODIUM 100 MG/ML
40 INJECTION SUBCUTANEOUS DAILY
Status: DISCONTINUED | OUTPATIENT
Start: 2018-08-14 | End: 2018-08-15

## 2018-08-14 RX ORDER — ONDANSETRON 2 MG/ML
4 INJECTION INTRAMUSCULAR; INTRAVENOUS EVERY 6 HOURS PRN
Status: DISCONTINUED | OUTPATIENT
Start: 2018-08-14 | End: 2018-08-15

## 2018-08-14 RX ORDER — LOSARTAN POTASSIUM 25 MG/1
25 TABLET ORAL DAILY
Status: DISCONTINUED | OUTPATIENT
Start: 2018-08-14 | End: 2018-08-15

## 2018-08-14 RX ORDER — LOSARTAN POTASSIUM 50 MG/1
50 TABLET ORAL NIGHTLY
Status: DISCONTINUED | OUTPATIENT
Start: 2018-08-14 | End: 2018-08-14

## 2018-08-14 RX ORDER — HYDRALAZINE HYDROCHLORIDE 20 MG/ML
10 INJECTION INTRAMUSCULAR; INTRAVENOUS EVERY 4 HOURS PRN
Status: DISCONTINUED | OUTPATIENT
Start: 2018-08-14 | End: 2018-08-15

## 2018-08-14 RX ORDER — LOSARTAN POTASSIUM 50 MG/1
50 TABLET ORAL NIGHTLY
Status: DISCONTINUED | OUTPATIENT
Start: 2018-08-14 | End: 2018-08-15

## 2018-08-14 RX ORDER — ACETAMINOPHEN 500 MG
1000 TABLET ORAL ONCE
Status: COMPLETED | OUTPATIENT
Start: 2018-08-14 | End: 2018-08-14

## 2018-08-14 RX ORDER — POTASSIUM CHLORIDE 20 MEQ/1
40 TABLET, EXTENDED RELEASE ORAL ONCE
Status: COMPLETED | OUTPATIENT
Start: 2018-08-14 | End: 2018-08-14

## 2018-08-14 RX ORDER — ACETAMINOPHEN 325 MG/1
650 TABLET ORAL EVERY 6 HOURS PRN
Status: DISCONTINUED | OUTPATIENT
Start: 2018-08-14 | End: 2018-08-15

## 2018-08-14 RX ORDER — SODIUM CHLORIDE 9 MG/ML
INJECTION, SOLUTION INTRAVENOUS CONTINUOUS
Status: DISCONTINUED | OUTPATIENT
Start: 2018-08-14 | End: 2018-08-15

## 2018-08-14 NOTE — PROGRESS NOTES
NURSING ADMISSION NOTE      Patient admitted via Cart, from ER. Pt is A&O X3, not in any distress, on RA, SR per tele, bp is running high, orthostatic v/s is done, due bp meds given, held ivf at present until cards see the pt. Oriented to room.   Safet

## 2018-08-14 NOTE — CONSULTS
[facility]  Cardiology Consultation    Edy Izquierdo Patient Status:  Observation    1928 MRN [id number]   [facility] 8NE-A Attending Brigette Huerta MD   Hosp Day # 0 PCP Justin Saenz MD     Reason for Consultation:   Fall, p Oral, Daily  •  Losartan Potassium (COZAAR) tab 50 mg, 50 mg, Oral, Nightly  •  0.9%  NaCl infusion, , Intravenous, Continuous  •  Enoxaparin Sodium (LOVENOX) 40 MG/0.4ML injection 40 mg, 40 mg, Subcutaneous, Daily  •  acetaminophen (TYLENOL) tab 650 mg, 6 Cxr: normal    Impression:  Principal Problem:    Syncope and collapse: versus unsteadiness/dizziness with fall, and loc after head trauma  Active Problems:    Coronary artery disease: no angina    HTN (hypertension)    Contusion of scalp, initial encounte

## 2018-08-14 NOTE — H&P
[facility] HOSPITAL[facility]  History and Physical     Gracy Moe Patient Status:  Observation    1928 MRN [id number]   [facility] 8NE-A Attending Anabel Jhaveri MD   Hosp Day # 0 PCP Hellen Merino MD     Chief Complaint: Syncope    His Prescriptions on File Prior to Encounter:  furosemide 20 MG Oral Tab Take 1 tablet (20 mg total) by mouth daily. Disp: 30 tablet Rfl: 0   Losartan Potassium 50 MG Oral Tab Take 50 mg by mouth nightly.    Disp:  Rfl:    Oxybutynin Chloride ER 5 MG Oral Table of 0.86 mg/dL). Recent Labs   Lab  08/14/18   0857   PTP  13.1   INR  1.01       Recent Labs   Lab  08/14/18   0857   TROP  <0.046       Imaging: Imaging data reviewed in Epic. ASSESSMENT / PLAN:     1.  Syncope: Etiology unclear, telemetry monitori

## 2018-08-14 NOTE — ED INITIAL ASSESSMENT (HPI)
Patient was standing at sink this morning, felt weak and fell backward. She has contusion on posterior scalp. States she may have passed out.

## 2018-08-15 ENCOUNTER — APPOINTMENT (OUTPATIENT)
Dept: CV DIAGNOSTICS | Facility: HOSPITAL | Age: 83
End: 2018-08-15
Attending: INTERNAL MEDICINE
Payer: MEDICARE

## 2018-08-15 VITALS
DIASTOLIC BLOOD PRESSURE: 61 MMHG | TEMPERATURE: 98 F | RESPIRATION RATE: 18 BRPM | HEART RATE: 83 BPM | WEIGHT: 127.44 LBS | HEIGHT: 61 IN | BODY MASS INDEX: 24.06 KG/M2 | SYSTOLIC BLOOD PRESSURE: 143 MMHG | OXYGEN SATURATION: 97 %

## 2018-08-15 LAB — POTASSIUM SERPL-SCNC: 3.7 MMOL/L (ref 3.6–5.1)

## 2018-08-15 PROCEDURE — 99217 OBSERVATION CARE DISCHARGE: CPT | Performed by: HOSPITALIST

## 2018-08-15 PROCEDURE — 93306 TTE W/DOPPLER COMPLETE: CPT | Performed by: INTERNAL MEDICINE

## 2018-08-15 RX ORDER — POTASSIUM CHLORIDE 20 MEQ/1
40 TABLET, EXTENDED RELEASE ORAL ONCE
Status: COMPLETED | OUTPATIENT
Start: 2018-08-15 | End: 2018-08-15

## 2018-08-15 NOTE — PHYSICAL THERAPY NOTE
PHYSICAL THERAPY QUICK EVALUATION - INPATIENT    Room Number: 7469/2075-P  Evaluation Date: 8/15/2018  Presenting Problem: Syncope and collapse  Physician Order: PT Eval and Treat    Problem List  Principal Problem:    Syncope and collapse  Active Problems (denies pain at rest, back of head tender to touch)  Location: back of head (site of trauma)  Management Techniques: Relaxation   RANGE OF MOTION AND STRENGTH ASSESSMENT  Upper extremity ROM and strength are within functional limits     Lower extremity ROM standing up from a chair with arms (e.g., wheelchair, bedside commode, etc.): None   -   Moving from lying on back to sitting on the side of the bed?: None   How much help from another person does the patient currently need. ..   -   Moving to and from a be recommend OUTPATIENT PT upon discharge for higher level balance training to reduce fall risk. Recommendations discussed with the pt and pt declines outpatient therapy. Pt educated on fall risk and benefits of therapy.   Pt reports that she attended Nikki Arias

## 2018-08-15 NOTE — PROGRESS NOTES
08/15/18 0817   Clinical Encounter Type   Referral To (Referral made to [employer] for [address] communion as requested.   Chaplai to remain available at pager [phone].)   Confucianism Encounters   Spiritual Requests During Visit / Hospitalization Monae

## 2018-08-15 NOTE — PROGRESS NOTES
Discharge instructions given w/ printed avs-patient verbalized understanding  No lasix med upon discharge per lee worthington

## 2018-08-15 NOTE — CONSULTS
[facility]    Report of Consultation    Allison Lin Patient Status:  Observation    1928 MRN [id number]   [facility] 8NE-A Attending Rex Gonzalez MD   Hosp Day # 0 PCP Keila Haile MD     Date of Admission:  2018 alcohol per week . She reports that she does not use drugs.     Allergies:    Atorvastatin            MYALGIA  Sulfa Antibiotics       RASH    Comment:Other reaction(s): SULFA (SULFONAMIDE ANTIBIOTICS)    Medications:    Current Facility-Administered Medica K 3.7 08/14/2018    08/14/2018   CO2 28.0 08/14/2018   BUN 15 08/14/2018   CREATSERUM 0.86 08/14/2018    08/14/2018   CA 8.8 08/14/2018   ALKPHO 81 08/14/2018   ALT 17 08/14/2018   AST 17 08/14/2018   BILT 0.6 08/14/2018   ALB 3.6 08/14/2018 stenosis. Will sign off. Please with any questions.     Thank you for allowing me to participate in the evaluation of your patient,    Philip Hand,   Neuromuscular and General Neurology  [facility]   pager [phone]

## 2018-08-15 NOTE — OCCUPATIONAL THERAPY NOTE
OCCUPATIONAL THERAPY QUICK EVALUATION - INPATIENT    Room Number: 1474/1518-L  Evaluation Date: 8/15/2018     Type of Evaluation: Quick Eval  Presenting Problem: Syncope and Collapse    Physician Order: IP Consult to Occupational Therapy  Reason for [facility] 19-year-old female presents emergency room for evaluation after she had a fall/syncopal episode.   Patient states she was in her kitchen this morning, had had a small breakfast and was drinking some coffee, states that she stood up to use the phone while sh History of Present Illness: Viviane Chun is a 80year old female with past medical history of coronary artery disease status post stent placement presents to the emergency department with a syncopal event.   Patient was standing up answering her telephon Precautions: None  Fall Risk: High fall risk    WEIGHT BEARING RESTRICTION  Weight Bearing Restriction: None                PAIN ASSESSMENT  Ratin          COGNITION  No issues noted, able to make needs known    RANGE OF MOTION AND STRENGTH ASSESSMENT Skilled Therapy Provided: Pt presents up in room with PCT. Pt education on Role of OT, POC, D/C plan, Energy Conservation Techniques, Home Safety/Fall Prevention with patient asking questions related to home/community/lesiure situations.  Pt/therapist prob Patient able to toilet transfer: safely and independently  Patient able to dress lower extremities: safely and independently  Patient/Caregiver able to demonstrate safety with ADLS: safely and independently

## 2018-08-15 NOTE — PROGRESS NOTES
[facility]  Cardiology Progress Note    Subjective:  Feels better. B/p better. Objective:  /75 (BP Location: Left arm)   Pulse 94   Temp 98 °F (36.7 °C) (Oral)   Resp 18   Ht 5' 1\" (1.549 m)   Wt 127 lb 6.8 oz (57.8 kg)   SpO2 94%   BMI 24.

## 2018-08-15 NOTE — PROGRESS NOTES
[facility]  Progress Note     Artemio Vigil Patient Status:  Observation    1928 MRN [id number]   [facility] 8NE-A Attending Adelina Wilde MD   Hosp Day # 0 PCP Kaela Holt MD     Chief Complaint: Syncope    S: Patient w DC home if echo is negative    Quality:  · DVT Prophylaxis: Lovenox  · CODE status: Full Code  · [place]: None  · Central line: None    Estimated date of discharge: Today  Discharge is dependent on: Echo  At this point Ms. Mimi Cazares is expected to be discharge t

## 2018-08-15 NOTE — PROGRESS NOTES
Discharge instructions given w/ printed avs-verbalized understanding  Discharge home w/ belongings accompanied by transporter

## 2018-08-16 NOTE — DISCHARGE SUMMARY
[facility]OSPITAL[facility]  DISCHARGE SUMMARY     Khushi Fitzgerald Patient Status:  Observation    1928 MRN [id number]   [facility] 8NE-A Attending No att. providers found   Hosp Day # 0 PCP Ced Clements MD     Date of Admission: 2018 Oxybutynin Chloride ER 5 MG Tb24  Commonly known as:  DITROPAN-XL      Take 1 tablet (5 mg total) by mouth once daily. Quantity:  90 tablet  Refills:  1     Vitamin B-12 1000 MCG Tabs  Commonly known as:  VITAMIN B12      Take 1,000 mcg by mouth daily.

## 2018-08-17 ENCOUNTER — PATIENT OUTREACH (OUTPATIENT)
Dept: CASE MANAGEMENT | Age: 83
End: 2018-08-17

## 2018-08-17 DIAGNOSIS — Z02.9 ENCOUNTERS FOR ADMINISTRATIVE PURPOSE: ICD-10-CM

## 2018-08-17 NOTE — PROGRESS NOTES
Initial Post Discharge Follow Up   Discharge Date: 8/15/18  Contact Date: 8/17/2018    Consent Verification:  Assessment Completed With: Patient  HIPAA Verified?   Yes    Discharge Dx:  Syncope and collapse    General:   • How have you been since your [facility] anything? yes  • Are there any reasons that keep you from taking your medication as prescribed? No  Are you having any concerns with constipation? No    Referrals/orders at D/C:  Home Health ordered at D/C? No    DME ordered at D/C? No    Services ordered a

## 2018-08-29 ENCOUNTER — OFFICE VISIT (OUTPATIENT)
Dept: INTERNAL MEDICINE CLINIC | Facility: CLINIC | Age: 83
End: 2018-08-29
Payer: MEDICARE

## 2018-08-29 VITALS
HEART RATE: 77 BPM | OXYGEN SATURATION: 98 % | RESPIRATION RATE: 12 BRPM | DIASTOLIC BLOOD PRESSURE: 74 MMHG | WEIGHT: 131.5 LBS | TEMPERATURE: 98 F | SYSTOLIC BLOOD PRESSURE: 144 MMHG | BODY MASS INDEX: 25 KG/M2

## 2018-08-29 DIAGNOSIS — R32 URINARY INCONTINENCE, UNSPECIFIED TYPE: ICD-10-CM

## 2018-08-29 DIAGNOSIS — M17.0 BILATERAL PRIMARY OSTEOARTHRITIS OF KNEE: ICD-10-CM

## 2018-08-29 DIAGNOSIS — I10 ESSENTIAL HYPERTENSION: ICD-10-CM

## 2018-08-29 DIAGNOSIS — R55 SYNCOPE, UNSPECIFIED SYNCOPE TYPE: Primary | ICD-10-CM

## 2018-08-29 DIAGNOSIS — K59.09 CHRONIC CONSTIPATION: ICD-10-CM

## 2018-08-29 DIAGNOSIS — I65.23 BILATERAL CAROTID ARTERY STENOSIS: ICD-10-CM
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
## 2018-08-29 PROBLEM — S00.03XA CONTUSION OF SCALP, INITIAL ENCOUNTER: Status: RESOLVED | Noted: 2018-08-14 | Resolved: 2018-08-29

## 2018-08-29 PROCEDURE — 1111F DSCHRG MED/CURRENT MED MERGE: CPT | Performed by: INTERNAL MEDICINE

## 2018-08-29 PROCEDURE — 99495 TRANSJ CARE MGMT MOD F2F 14D: CPT | Performed by: INTERNAL MEDICINE

## 2018-08-29 NOTE — PATIENT INSTRUCTIONS
- Follow up with the heart clinic  - Schedule appointment at the hospital for getting the 30 day heart monitor  - For constipation, you can try over the counter options such as Miralax (polyethylene glycol) or Senna/Senokot (Senna-docusate).   - Make sure t

## 2018-08-29 NOTE — PROGRESS NOTES
HPI:    Brigida Munguia is a 80year old female here today for hospital follow up.    She was discharged from Inpatient hospital, [facility] to Home   Admission Date: 8/14/18   Discharge Date: 8/15/18  Hospital Discharge Diagnoses (since 7/30/2018) she came back from the hospital.    Blood pressure borderline today in the office. Has been running a little higher at home.     She was found to have bilateral carotid artery stenosis on dopplers in the hospital.    Was advised to stop furosemide, patient weakness  PSYCHE: denies depression or anxiety  HEMATOLOGIC: denies hx of anemia, or bruising, denies bleeding  ENDOCRINE: denies thyroid history  ALL/ASTHMA: denies hx of allergy or asthma    PHYSICAL EXAM:   No LMP recorded.  Patient is postmenopausal.  E per patient though I see no documentation of this. May have been to avoid orthostatic hypotension. Will hold furosemide for now. 3.  Urinary incontinence, unspecified type  Oxybutynin no longer helping.   Patient would like to discontinue this medicati

## 2018-09-04 ENCOUNTER — PRIOR ORIGINAL RECORDS (OUTPATIENT)
Dept: OTHER | Age: 83
End: 2018-09-04

## 2018-09-14 ENCOUNTER — PRIOR ORIGINAL RECORDS (OUTPATIENT)
Dept: OTHER | Age: 83
End: 2018-09-14

## 2018-09-18 ENCOUNTER — HOSPITAL ENCOUNTER (OUTPATIENT)
Dept: CV DIAGNOSTICS | Facility: HOSPITAL | Age: 83
Discharge: HOME | End: 2018-09-18
Attending: INTERNAL MEDICINE
Payer: MEDICARE

## 2018-09-18 DIAGNOSIS — R55 SYNCOPE, UNSPECIFIED SYNCOPE TYPE: ICD-10-CM

## 2018-09-18 PROCEDURE — 93272 ECG/REVIEW INTERPRET ONLY: CPT | Performed by: INTERNAL MEDICINE

## 2018-09-18 PROCEDURE — 93271 ECG/MONITORING AND ANALYSIS: CPT | Performed by: INTERNAL MEDICINE

## 2018-09-18 PROCEDURE — 93270 REMOTE 30 DAY ECG REV/REPORT: CPT | Performed by: INTERNAL MEDICINE

## 2018-11-02 ENCOUNTER — TELEPHONE (OUTPATIENT)
Dept: INTERNAL MEDICINE CLINIC | Facility: CLINIC | Age: 83
End: 2018-11-02

## 2018-11-02 NOTE — TELEPHONE ENCOUNTER
Spoke to pts son (on Communication waiver) rg 30 day even monitor done on 9/18/2018. Pts son verbalized understanding results will discuss with pt.

## 2018-11-02 NOTE — TELEPHONE ENCOUNTER
Pt son received a message and was returning our call. Caller not sure what call was about. Please call pt son back.

## 2018-11-16 ENCOUNTER — TELEPHONE (OUTPATIENT)
Dept: INTERNAL MEDICINE CLINIC | Facility: CLINIC | Age: 83
End: 2018-11-16

## 2018-11-19 ENCOUNTER — OFFICE VISIT (OUTPATIENT)
Dept: INTERNAL MEDICINE CLINIC | Facility: CLINIC | Age: 83
End: 2018-11-19
Payer: MEDICARE

## 2018-11-19 VITALS
BODY MASS INDEX: 26.01 KG/M2 | HEIGHT: 60 IN | RESPIRATION RATE: 12 BRPM | HEART RATE: 68 BPM | WEIGHT: 132.5 LBS | DIASTOLIC BLOOD PRESSURE: 78 MMHG | TEMPERATURE: 98 F | SYSTOLIC BLOOD PRESSURE: 120 MMHG

## 2018-11-19 DIAGNOSIS — M17.0 BILATERAL PRIMARY OSTEOARTHRITIS OF KNEE: Primary | ICD-10-CM

## 2018-11-19 PROCEDURE — 20610 DRAIN/INJ JOINT/BURSA W/O US: CPT | Performed by: PHYSICIAN ASSISTANT

## 2018-11-19 RX ORDER — TRIAMCINOLONE ACETONIDE 40 MG/ML
40 INJECTION, SUSPENSION INTRA-ARTICULAR; INTRAMUSCULAR ONCE
Status: COMPLETED | OUTPATIENT
Start: 2018-11-19 | End: 2018-11-19

## 2018-11-19 RX ADMIN — TRIAMCINOLONE ACETONIDE 40 MG: 40 INJECTION, SUSPENSION INTRA-ARTICULAR; INTRAMUSCULAR at 13:29:00

## 2018-11-19 RX ADMIN — TRIAMCINOLONE ACETONIDE 40 MG: 40 INJECTION, SUSPENSION INTRA-ARTICULAR; INTRAMUSCULAR at 13:26:00

## 2018-11-19 NOTE — PATIENT INSTRUCTIONS
Corticosteroid (“Cortisone”) Knee Injections  Instructions Following Injections    The Steroid  Corticosteroids work by reducing inflammation directly in the affected joint.   The steroid portion of the injection may take up to 2-3 days after your injection swelling, rashes, chest tightness, shortness of breath, difficulty breathing you should seek medical attention immediately. [facility] * [address] [facility] [address] [address] [facility] 400

## 2018-11-19 NOTE — PROGRESS NOTES
Bishop Beckett is a 80year old female. HPI:   Patient presents for f/u of bilat knee OA. Has had pain for several years. Denies recent injury.   Had cortisone shots done 5/21/18 which provided about nearly 100% relief for several weeks until she enrico Diffuse bony tenderness. Ligaments appear stable. NEUROVASCULAR: Bilateral LE strength 5/5. +PF/DF. DP & PT pulses present bilaterally.     ASSESSMENT AND PLAN:   # Bilat knee OA: discussed the use of ice, tylenol, activity modification, corticosteroid

## 2019-01-02 ENCOUNTER — TELEPHONE (OUTPATIENT)
Dept: INTERNAL MEDICINE CLINIC | Facility: CLINIC | Age: 84
End: 2019-01-02

## 2019-01-02 DIAGNOSIS — G89.29 CHRONIC PAIN OF BOTH KNEES: ICD-10-CM

## 2019-01-02 DIAGNOSIS — Z00.00 ENCOUNTER FOR ANNUAL HEALTH EXAMINATION: ICD-10-CM

## 2019-01-02 DIAGNOSIS — Z13.31 DEPRESSION SCREENING: ICD-10-CM

## 2019-01-02 DIAGNOSIS — R32 URINARY INCONTINENCE, UNSPECIFIED TYPE: Primary | ICD-10-CM

## 2019-01-02 DIAGNOSIS — M25.562 CHRONIC PAIN OF BOTH KNEES: ICD-10-CM

## 2019-01-02 DIAGNOSIS — M25.561 CHRONIC PAIN OF BOTH KNEES: ICD-10-CM

## 2019-01-02 RX ORDER — OXYBUTYNIN CHLORIDE 5 MG/1
5 TABLET, EXTENDED RELEASE ORAL
Qty: 90 TABLET | Refills: 1 | Status: SHIPPED | OUTPATIENT
Start: 2019-01-02 | End: 2019-07-29

## 2019-01-02 NOTE — TELEPHONE ENCOUNTER
Patient calling in seeking a refill for Oxybutynin Chloride ER 5 MG Oral Tablet 24 Hr     To be sent to:  [address], [facility] [phone], [phone]

## 2019-01-02 NOTE — TELEPHONE ENCOUNTER
Pt would like refill from Dr Matilde Castelan for Oxybutynin ER 5 mg daily. Pt using [facility].

## 2019-01-02 NOTE — TELEPHONE ENCOUNTER
Prescription sent to [facility]. She is due for Medicare Wellness visit in May. Follow up earlier as needed for acute issues.

## 2019-02-28 VITALS
WEIGHT: 129 LBS | DIASTOLIC BLOOD PRESSURE: 70 MMHG | HEART RATE: 72 BPM | SYSTOLIC BLOOD PRESSURE: 132 MMHG | HEIGHT: 62 IN | BODY MASS INDEX: 23.74 KG/M2

## 2019-02-28 VITALS
HEART RATE: 74 BPM | DIASTOLIC BLOOD PRESSURE: 70 MMHG | HEIGHT: 62 IN | SYSTOLIC BLOOD PRESSURE: 144 MMHG | WEIGHT: 129 LBS | BODY MASS INDEX: 23.74 KG/M2

## 2019-02-28 VITALS — SYSTOLIC BLOOD PRESSURE: 116 MMHG | HEART RATE: 68 BPM | DIASTOLIC BLOOD PRESSURE: 68 MMHG

## 2019-02-28 VITALS
BODY MASS INDEX: 24.11 KG/M2 | SYSTOLIC BLOOD PRESSURE: 118 MMHG | HEIGHT: 62 IN | WEIGHT: 131 LBS | HEART RATE: 72 BPM | DIASTOLIC BLOOD PRESSURE: 78 MMHG

## 2019-03-01 VITALS
WEIGHT: 128 LBS | BODY MASS INDEX: 23.55 KG/M2 | SYSTOLIC BLOOD PRESSURE: 112 MMHG | HEART RATE: 74 BPM | DIASTOLIC BLOOD PRESSURE: 78 MMHG | HEIGHT: 62 IN

## 2019-03-01 VITALS
HEIGHT: 62 IN | HEART RATE: 84 BPM | WEIGHT: 132 LBS | BODY MASS INDEX: 24.29 KG/M2 | SYSTOLIC BLOOD PRESSURE: 148 MMHG | DIASTOLIC BLOOD PRESSURE: 78 MMHG

## 2019-03-06 RX ORDER — OXYBUTYNIN CHLORIDE 5 MG/1
TABLET ORAL DAILY
COMMUNITY
Start: 2017-10-31

## 2019-03-06 RX ORDER — LOSARTAN POTASSIUM 50 MG/1
TABLET ORAL
COMMUNITY
Start: 2018-02-06 | End: 2019-03-25 | Stop reason: SDUPTHER

## 2019-03-06 RX ORDER — LOSARTAN POTASSIUM 25 MG/1
TABLET ORAL
COMMUNITY
Start: 2018-02-06 | End: 2019-03-25 | Stop reason: SDUPTHER

## 2019-03-06 RX ORDER — ACETAMINOPHEN 500 MG
TABLET ORAL PRN
COMMUNITY
Start: 2017-10-31

## 2019-03-06 RX ORDER — LANOLIN ALCOHOL/MO/W.PET/CERES
1 CREAM (GRAM) TOPICAL EVERY OTHER DAY
COMMUNITY
Start: 2012-08-06

## 2019-03-12 ENCOUNTER — OFFICE VISIT (OUTPATIENT)
Dept: CARDIOLOGY | Age: 84
End: 2019-03-12

## 2019-03-12 VITALS
DIASTOLIC BLOOD PRESSURE: 64 MMHG | HEART RATE: 65 BPM | HEIGHT: 62 IN | BODY MASS INDEX: 23.92 KG/M2 | WEIGHT: 130 LBS | SYSTOLIC BLOOD PRESSURE: 126 MMHG

## 2019-03-12 DIAGNOSIS — Z87.898 HISTORY OF SYNCOPE: Primary | ICD-10-CM

## 2019-03-12 DIAGNOSIS — I95.1 ORTHOSTATIC HYPOTENSION: ICD-10-CM

## 2019-03-12 DIAGNOSIS — I87.2 VENOUS INSUFFICIENCY (CHRONIC) (PERIPHERAL): ICD-10-CM

## 2019-03-12 DIAGNOSIS — I25.10 ASHD (ARTERIOSCLEROTIC HEART DISEASE): ICD-10-CM

## 2019-03-12 DIAGNOSIS — E78.00 PURE HYPERCHOLESTEROLEMIA: ICD-10-CM

## 2019-03-12 DIAGNOSIS — I10 ESSENTIAL HYPERTENSION: ICD-10-CM

## 2019-03-12 PROCEDURE — 99214 OFFICE O/P EST MOD 30 MIN: CPT | Performed by: INTERNAL MEDICINE

## 2019-03-25 ENCOUNTER — TELEPHONE (OUTPATIENT)
Dept: CARDIOLOGY | Age: 84
End: 2019-03-25

## 2019-03-25 RX ORDER — LOSARTAN POTASSIUM 50 MG/1
50 TABLET ORAL DAILY
Qty: 90 TABLET | Refills: 3 | Status: SHIPPED | OUTPATIENT
Start: 2019-03-25 | End: 2020-03-10 | Stop reason: ALTCHOICE

## 2019-03-25 RX ORDER — LOSARTAN POTASSIUM 25 MG/1
25 TABLET ORAL DAILY
Qty: 90 TABLET | Refills: 3 | Status: SHIPPED | OUTPATIENT
Start: 2019-03-25 | End: 2020-03-10 | Stop reason: SDUPTHER

## 2019-04-04 ENCOUNTER — OFFICE VISIT (OUTPATIENT)
Dept: INTERNAL MEDICINE CLINIC | Facility: CLINIC | Age: 84
End: 2019-04-04
Payer: MEDICARE

## 2019-04-04 VITALS
TEMPERATURE: 98 F | WEIGHT: 130.5 LBS | DIASTOLIC BLOOD PRESSURE: 80 MMHG | HEART RATE: 96 BPM | RESPIRATION RATE: 12 BRPM | SYSTOLIC BLOOD PRESSURE: 138 MMHG | BODY MASS INDEX: 26.31 KG/M2 | HEIGHT: 59.25 IN

## 2019-04-04 DIAGNOSIS — R29.6 MULTIPLE FALLS: ICD-10-CM

## 2019-04-04 DIAGNOSIS — M79.661 PAIN IN RIGHT LOWER LEG: Primary | ICD-10-CM

## 2019-04-04 PROCEDURE — 99214 OFFICE O/P EST MOD 30 MIN: CPT | Performed by: INTERNAL MEDICINE

## 2019-04-04 RX ORDER — MELOXICAM 7.5 MG/1
7.5 TABLET ORAL DAILY
Qty: 30 TABLET | Refills: 0 | Status: SHIPPED | OUTPATIENT
Start: 2019-04-04 | End: 2019-04-08

## 2019-04-04 RX ORDER — METHYLPREDNISOLONE 4 MG/1
TABLET ORAL
Qty: 1 KIT | Refills: 0 | Status: SHIPPED | OUTPATIENT
Start: 2019-04-04 | End: 2019-08-07 | Stop reason: ALTCHOICE

## 2019-04-04 NOTE — PROGRESS NOTES
Tameka Garcia is a 80year old female. HPI:   Patient presents with:  Leg Pain  Swelling: in her ankles  Patient presents with several issues. She has been dealing with pain in the right leg.   Constant pain, aching in quality, moderate to high severi intraocular lens implant (Right, 6/5/00); Cataract extraction w/  intraocular lens implant (Left, 6/30/02); angioplasty (coronary) (2012); tonsillectomy; and cataract.   Family: family history includes Heart Disorder in her mother; Heart Disorder (age of on indicates understanding of these issues and agrees to the plan. The patient is asked to return to clinic in 1-2 months with myself for follow up on chronic issues/Medicare Wellness visit, or earlier if acute issues arise.     Justin Saenz MD

## 2019-04-04 NOTE — PATIENT INSTRUCTIONS
- Start anti-inflammatory (meloxicam). Take 1 tablet daily for next two weeks, then take as needed. - Start steroids (methylprednisolone). Follow instructions on the box. - Continue with home physical therapy exercises.     It was a pleasure seeing you

## 2019-04-05 ENCOUNTER — TELEPHONE (OUTPATIENT)
Dept: CARDIOLOGY | Age: 84
End: 2019-04-05

## 2019-04-07 PROBLEM — I77.9 CAROTID DISEASE, BILATERAL (HCC): Chronic | Status: ACTIVE | Noted: 2018-08-14

## 2019-04-07 PROBLEM — M17.0 BILATERAL PRIMARY OSTEOARTHRITIS OF KNEE: Chronic | Status: ACTIVE | Noted: 2018-05-08

## 2019-04-08 ENCOUNTER — TELEPHONE (OUTPATIENT)
Dept: INTERNAL MEDICINE CLINIC | Facility: CLINIC | Age: 84
End: 2019-04-08

## 2019-04-08 ENCOUNTER — OFFICE VISIT (OUTPATIENT)
Dept: INTERNAL MEDICINE CLINIC | Facility: CLINIC | Age: 84
End: 2019-04-08
Payer: MEDICARE

## 2019-04-08 VITALS
HEIGHT: 59.25 IN | TEMPERATURE: 97 F | DIASTOLIC BLOOD PRESSURE: 82 MMHG | RESPIRATION RATE: 12 BRPM | SYSTOLIC BLOOD PRESSURE: 134 MMHG | WEIGHT: 128.75 LBS | HEART RATE: 80 BPM | BODY MASS INDEX: 25.96 KG/M2

## 2019-04-08 DIAGNOSIS — R07.89 ATYPICAL CHEST PAIN: Primary | ICD-10-CM

## 2019-04-08 DIAGNOSIS — I25.10 CORONARY ARTERY DISEASE INVOLVING NATIVE CORONARY ARTERY OF NATIVE HEART WITHOUT ANGINA PECTORIS: Chronic | ICD-10-CM

## 2019-04-08 PROCEDURE — 93000 ELECTROCARDIOGRAM COMPLETE: CPT | Performed by: INTERNAL MEDICINE

## 2019-04-08 PROCEDURE — 99214 OFFICE O/P EST MOD 30 MIN: CPT | Performed by: INTERNAL MEDICINE

## 2019-04-08 NOTE — TELEPHONE ENCOUNTER
VO per Dr Domnick Sacks ok for pt to come in office today. Pt informed to come right over to our office. It was encouraged pt find transportation from family member or neighbor. Pt verbalizes understanding and will be her in about 1/2 hour.

## 2019-04-08 NOTE — PROGRESS NOTES
Edy Triana is a 80year old female. HPI:   Patient presents with:  Chest Pain  Patient presents with complaint of chest pain. Here with her cousin. Sunday - woke up with facial swelling, flushing. Had similar symptoms this morning.   Lasts for two tonsillectomy; and cataract. Family: family history includes Heart Disorder in her mother; Heart Disorder (age of onset: 61) in her son. Social:  reports that she has never smoked.  She has never used smokeless tobacco. She reports that she drinks about 1 She is finishing off her taper. Some of the facial flushing could be from the steroids, but she will be done in 1-2 days. Monitor for now.      Start Time: 3:15 PM  End Time: 3:45 PM  More than 25 minutes were spent face to face with the patient in which

## 2019-04-08 NOTE — TELEPHONE ENCOUNTER
methylPREDNISolone (MEDROL) 4 MG Oral Tablet Therapy Pack     Patient feels she is having allergic reaction; yesterday her face in am was very red all over; it cleared and this am she said it was all red again.  Please callback to discuss

## 2019-04-08 NOTE — PATIENT INSTRUCTIONS
- Continue steroids until they are finished  - If your leg pain or ankle swelling comes back, start the other medication (meloxicam). Take 1 tablet daily with food for next couple of weeks. - Schedule echocardiogram (heart ultrasound). [phone].   González Morales

## 2019-04-08 NOTE — TELEPHONE ENCOUNTER
Pt called to report yesterday morning she woke with a flushed face which resolved. Today pt woke with the same flushd face which has also now cleared.    In the middle of the night pt reports she experienced chest heaviness and pain towards her left breast

## 2019-04-13 NOTE — HISTORICAL OFFICE NOTE
Todd Estrada  : 1928  ACCOUNT:  [id number]  [phone]  PCP: Dr. Wadsworth Peers     TODAY'S DATE: 2018  DICTATED BY:  [Dr. Tonia Oorzco: [Followup of CAD, of native vessels, Followup of Hypertension, benign, Followup of Ve permitting. DIET: general. MARITAL STATUS: . OCCUPATION: .      ALLERGIES: Sulfa Antibiotics - CLASS    MEDICATIONS: Selected prescriptions see below    VITAL SIGNS: [B/P - 118/78, Pulse - 72, Weight - 131, Height -   62 , BMI - 24 one 25 mg tablet in A.M.  one 50 mg      02/06/18 Vitamin D3            1000UNIT  1 cap daily                              10/31/17 Oxybutynin Chloride   5MG       daily                                    10/31/17 Tylenol Extra [id number]     as needed DC instructions

## 2019-04-30 ENCOUNTER — HOSPITAL ENCOUNTER (OUTPATIENT)
Dept: CV DIAGNOSTICS | Age: 84
Discharge: HOME | End: 2019-04-30
Attending: INTERNAL MEDICINE
Payer: MEDICARE

## 2019-04-30 DIAGNOSIS — R07.89 ATYPICAL CHEST PAIN: ICD-10-CM

## 2019-04-30 DIAGNOSIS — I25.10 CORONARY ARTERY DISEASE INVOLVING NATIVE CORONARY ARTERY OF NATIVE HEART WITHOUT ANGINA PECTORIS: Chronic | ICD-10-CM

## 2019-04-30 PROCEDURE — 93306 TTE W/DOPPLER COMPLETE: CPT | Performed by: INTERNAL MEDICINE

## 2019-05-07 ENCOUNTER — OFFICE VISIT (OUTPATIENT)
Dept: OPHTHALMOLOGY | Facility: CLINIC | Age: 84
End: 2019-05-07
Payer: MEDICARE

## 2019-05-07 DIAGNOSIS — H43.393 VITREOUS FLOATERS OF BOTH EYES: ICD-10-CM

## 2019-05-07 DIAGNOSIS — H04.203 TEARING EYES: ICD-10-CM

## 2019-05-07 DIAGNOSIS — Z96.1 PSEUDOPHAKIA OF BOTH EYES: Primary | ICD-10-CM

## 2019-05-07 DIAGNOSIS — H35.371 EPIRETINAL MEMBRANE, RIGHT EYE: ICD-10-CM

## 2019-05-07 PROCEDURE — 92014 COMPRE OPH EXAM EST PT 1/>: CPT | Performed by: OPHTHALMOLOGY

## 2019-05-07 PROCEDURE — 92015 DETERMINE REFRACTIVE STATE: CPT | Performed by: OPHTHALMOLOGY

## 2019-05-07 NOTE — PATIENT INSTRUCTIONS
Pseudophakia of both eyes  No treatment. New glasses today; suggest update. Epiretinal membrane, right eye  Stable, no treatment required. Will follow. Vitreous floaters of both eyes  No treatment required.        Tearing eyes  For tearing, patien

## 2019-05-07 NOTE — PROGRESS NOTES
Olga Whitaker is a 80year old female. HPI:     HPI     Patient is here for a complete exam.  Patient complains of difficulty reading small print. She would like an updated Rx today.   She also complains of tearing in both eyes OD>OS for several month (SULFONAMIDE ANTIBIOTICS)    ROS:       PHYSICAL EXAM:     Base Eye Exam     Visual Acuity (Snellen - Linear)       Right Left    Dist cc 20/50 +2 20/40    Dist ph cc 20/40 NI    Near cc 20/20 20/30    Correction:  Glasses          Tonometry (Applanation, update. Epiretinal membrane, right eye  Stable, no treatment required. Will follow. Vitreous floaters of both eyes  No treatment required.        Tearing eyes  For tearing, patient should try Naphcon-A, Opcon-A or Vasocon-A (over the counter eye dr

## 2019-05-07 NOTE — ASSESSMENT & PLAN NOTE
For tearing, patient should try Naphcon-A, Opcon-A or Vasocon-A (over the counter eye drops) up to 4 times a day in the affect eye(s) when tearing symptoms are present.    Advised patient that if it is used in one eye only it can cause pupil dilation in the

## 2019-05-09 ENCOUNTER — TELEPHONE (OUTPATIENT)
Dept: CARDIOLOGY | Age: 84
End: 2019-05-09

## 2019-05-10 ENCOUNTER — TELEPHONE (OUTPATIENT)
Dept: CARDIOLOGY | Age: 84
End: 2019-05-10

## 2019-07-09 ENCOUNTER — TELEPHONE (OUTPATIENT)
Dept: INTERNAL MEDICINE CLINIC | Facility: CLINIC | Age: 84
End: 2019-07-09

## 2019-07-09 ENCOUNTER — HOSPITAL ENCOUNTER (EMERGENCY)
Facility: HOSPITAL | Age: 84
Discharge: HOME | End: 2019-07-09
Attending: EMERGENCY MEDICINE
Payer: MEDICARE

## 2019-07-09 ENCOUNTER — APPOINTMENT (OUTPATIENT)
Dept: CT IMAGING | Facility: HOSPITAL | Age: 84
End: 2019-07-09
Attending: EMERGENCY MEDICINE
Payer: MEDICARE

## 2019-07-09 VITALS
RESPIRATION RATE: 19 BRPM | BODY MASS INDEX: 24 KG/M2 | TEMPERATURE: 98 F | OXYGEN SATURATION: 98 % | WEIGHT: 132.25 LBS | HEART RATE: 68 BPM | SYSTOLIC BLOOD PRESSURE: 187 MMHG | DIASTOLIC BLOOD PRESSURE: 79 MMHG

## 2019-07-09 DIAGNOSIS — S09.90XA INJURY OF HEAD, INITIAL ENCOUNTER: ICD-10-CM

## 2019-07-09 DIAGNOSIS — S16.1XXA STRAIN OF NECK MUSCLE, INITIAL ENCOUNTER: Primary | ICD-10-CM

## 2019-07-09 PROCEDURE — 93010 ELECTROCARDIOGRAM REPORT: CPT

## 2019-07-09 PROCEDURE — 99284 EMERGENCY DEPT VISIT MOD MDM: CPT

## 2019-07-09 PROCEDURE — 72125 CT NECK SPINE W/O DYE: CPT | Performed by: EMERGENCY MEDICINE

## 2019-07-09 PROCEDURE — 93005 ELECTROCARDIOGRAM TRACING: CPT

## 2019-07-09 PROCEDURE — 70450 CT HEAD/BRAIN W/O DYE: CPT | Performed by: EMERGENCY MEDICINE

## 2019-07-09 RX ORDER — TRAMADOL HYDROCHLORIDE 50 MG/1
50 TABLET ORAL ONCE
Status: COMPLETED | OUTPATIENT
Start: 2019-07-09 | End: 2019-07-09

## 2019-07-09 RX ORDER — TRAMADOL HYDROCHLORIDE 50 MG/1
50 TABLET ORAL EVERY 8 HOURS PRN
Qty: 10 TABLET | Refills: 0 | Status: SHIPPED | OUTPATIENT
Start: 2019-07-09 | End: 2019-07-20

## 2019-07-09 NOTE — ED PROVIDER NOTES
Patient Seen in: [facility] Emergency Department    History   Patient presents with:  Neck Pain (musculoskeletal, neurologic)  Headache (neurologic)    Stated Complaint: neck pain and headache    HPI    This is a 35-year-old female who states that s Pulse 78   Resp 14   Temp 97.9 °F (36.6 °C)   Temp src Temporal   SpO2 98 %   O2 Device None (Room air)       Current:BP (!) 187/79   Pulse 68   Temp 97.9 °F (36.6 °C) (Temporal)   Resp 19   Wt 60 kg   SpO2 98%   BMI 24.19 kg/m²         Physical Exam 6/22/2016, 12:09. [facility] , CT BRAIN OR HEAD ([id number]), 8/14/2018, 11:03. INDICATIONS:  neck pain and headache  TECHNIQUE:  Noncontrast CT scanning is performed through the brain. Dose reduction techniques were used.  Dose information is transmitted to the A left side of neck. Has been going on for 3 days. States she feels a \"clicking\" sound when she turns her neck. FINDINGS:  CRANIOCERVICAL AREA:  Normal foramen magnum with no Chiari malformation. PARASPINAL AREA:  Normal with no visible mass.   BONES: pressure as an outpatient. She feels comfortable I discussed if she has any numbness weakness loss of urine or bowel or increasing pain to return to the emergency room. She feels comfortable going home.   She is only taking 2000 mg total of Tylenol and th

## 2019-07-09 NOTE — ED INITIAL ASSESSMENT (HPI)
Per patient, has pain in the back of her neck and head and mild swelling to the left side of neck. Has been going on for 3 days. States she feels a \"clicking\" sound when she turns her neck.

## 2019-07-09 NOTE — TELEPHONE ENCOUNTER
Pt called office c/o neck pain, swelling behind her neck, headache and unable to turn her head side to side since Saturday. No c/o injury, nausea, vomiting, numbness, tingling or dizziness.   Pt instructed to seek eval in ER and instructed not to drive d/t

## 2019-07-09 NOTE — TELEPHONE ENCOUNTER
Patient calling in, requesting an appointment to be seen today. Pt is experiencing pain in her neck and the back of her head. This has been occurring since Saturday.

## 2019-07-09 NOTE — ED NOTES
Patient is eager to leave and does not wish to wait and see if BP will come down. Patient states she is \"fine\" and does not need to recheck her blood pressure. MD aware.

## 2019-07-10 LAB
ATRIAL RATE: 73 BPM
P AXIS: 63 DEGREES
P-R INTERVAL: 178 MS
Q-T INTERVAL: 396 MS
QRS DURATION: 82 MS
QTC CALCULATION (BEZET): 436 MS
R AXIS: 11 DEGREES
T AXIS: 36 DEGREES
VENTRICULAR RATE: 73 BPM

## 2019-07-19 NOTE — TELEPHONE ENCOUNTER
Patient calling in, requesting a refill for traMADol HCl 50 MG Oral Tab     To be sent to:  [facility] , [place] - [address] [place] [address] [phone], [phone]

## 2019-07-20 RX ORDER — TRAMADOL HYDROCHLORIDE 50 MG/1
50 TABLET ORAL EVERY 8 HOURS PRN
Qty: 15 TABLET | Refills: 0 | Status: SHIPPED | OUTPATIENT
Start: 2019-07-20 | End: 2019-07-27

## 2019-07-20 NOTE — TELEPHONE ENCOUNTER
Prescribed by Dr. Richard Bernard  Tramadol HCl 50 MG  Last OV relevant to medication: NA  Last refill date: 7-9-19  #/refills: 0  When pt was asked to return for OV: NA  Upcoming appt/reason: none  Recent labs: none

## 2019-07-29 ENCOUNTER — TELEPHONE (OUTPATIENT)
Dept: INTERNAL MEDICINE CLINIC | Facility: CLINIC | Age: 84
End: 2019-07-29

## 2019-07-29 DIAGNOSIS — R32 URINARY INCONTINENCE, UNSPECIFIED TYPE: ICD-10-CM

## 2019-07-29 RX ORDER — OXYBUTYNIN CHLORIDE 5 MG/1
TABLET, EXTENDED RELEASE ORAL
Qty: 90 TABLET | Refills: 1 | Status: SHIPPED | OUTPATIENT
Start: 2019-07-29 | End: 2020-12-04

## 2019-07-29 NOTE — TELEPHONE ENCOUNTER
OXYBUTYNIN CHLORIDE ER 5 MG Oral Tablet 24 Hr    Last OV relevant to medication: 04/08/2019    Last refill date: 01/02/2019     #/refills: #90 w/ 1 refill    When pt was asked to return for OV: 1-2 months    Upcoming appt/reason: No future appointments

## 2019-07-31 ENCOUNTER — APPOINTMENT (OUTPATIENT)
Dept: GENERAL RADIOLOGY | Facility: HOSPITAL | Age: 84
End: 2019-07-31
Attending: EMERGENCY MEDICINE
Payer: MEDICARE

## 2019-07-31 ENCOUNTER — APPOINTMENT (OUTPATIENT)
Dept: CT IMAGING | Facility: HOSPITAL | Age: 84
End: 2019-07-31
Attending: EMERGENCY MEDICINE
Payer: MEDICARE

## 2019-07-31 ENCOUNTER — HOSPITAL ENCOUNTER (EMERGENCY)
Facility: HOSPITAL | Age: 84
Discharge: HOME | End: 2019-07-31
Attending: EMERGENCY MEDICINE
Payer: MEDICARE

## 2019-07-31 VITALS
DIASTOLIC BLOOD PRESSURE: 101 MMHG | RESPIRATION RATE: 20 BRPM | SYSTOLIC BLOOD PRESSURE: 177 MMHG | OXYGEN SATURATION: 99 % | HEART RATE: 99 BPM

## 2019-07-31 DIAGNOSIS — R55 SYNCOPE AND COLLAPSE: ICD-10-CM

## 2019-07-31 DIAGNOSIS — S50.312A ABRASION OF LEFT ELBOW, INITIAL ENCOUNTER: ICD-10-CM
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
## 2019-07-31 DIAGNOSIS — S09.90XA INJURY OF HEAD, INITIAL ENCOUNTER: ICD-10-CM

## 2019-07-31 LAB
ALBUMIN SERPL-MCNC: 3.8 G/DL (ref 3.4–5)
ALBUMIN/GLOB SERPL: 1.2 {RATIO} (ref 1–2)
ALP LIVER SERPL-CCNC: 80 U/L (ref 55–142)
ALT SERPL-CCNC: 25 U/L (ref 13–56)
ANION GAP SERPL CALC-SCNC: 7 MMOL/L (ref 0–18)
AST SERPL-CCNC: 36 U/L (ref 15–37)
BASOPHILS # BLD AUTO: 0.04 X10(3) UL (ref 0–0.2)
BASOPHILS NFR BLD AUTO: 0.5 %
BILIRUB SERPL-MCNC: 0.4 MG/DL (ref 0.1–2)
BILIRUB UR QL STRIP.AUTO: NEGATIVE
BUN BLD-MCNC: 23 MG/DL (ref 7–18)
BUN/CREAT SERPL: 26.4 (ref 10–20)
CALCIUM BLD-MCNC: 9.6 MG/DL (ref 8.5–10.1)
CHLORIDE SERPL-SCNC: 104 MMOL/L (ref 98–112)
CLARITY UR REFRACT.AUTO: CLEAR
CO2 SERPL-SCNC: 27 MMOL/L (ref 21–32)
COLOR UR AUTO: YELLOW
CREAT BLD-MCNC: 0.87 MG/DL (ref 0.55–1.02)
DEPRECATED RDW RBC AUTO: 44.2 FL (ref 35.1–46.3)
EOSINOPHIL # BLD AUTO: 0.06 X10(3) UL (ref 0–0.7)
EOSINOPHIL NFR BLD AUTO: 0.8 %
ERYTHROCYTE [DISTWIDTH] IN BLOOD BY AUTOMATED COUNT: 13.4 % (ref 11–15)
GLOBULIN PLAS-MCNC: 3.1 G/DL (ref 2.8–4.4)
GLUCOSE BLD-MCNC: 111 MG/DL (ref 70–99)
GLUCOSE UR STRIP.AUTO-MCNC: NEGATIVE MG/DL
HCT VFR BLD AUTO: 41.1 % (ref 35–48)
HGB BLD-MCNC: 13.7 G/DL (ref 12–16)
HYALINE CASTS #/AREA URNS AUTO: PRESENT /LPF
IMM GRANULOCYTES # BLD AUTO: 0.02 X10(3) UL (ref 0–1)
IMM GRANULOCYTES NFR BLD: 0.3 %
KETONES UR STRIP.AUTO-MCNC: NEGATIVE MG/DL
LEUKOCYTE ESTERASE UR QL STRIP.AUTO: NEGATIVE
LYMPHOCYTES # BLD AUTO: 2.25 X10(3) UL (ref 1–4)
LYMPHOCYTES NFR BLD AUTO: 29.1 %
M PROTEIN MFR SERPL ELPH: 6.9 G/DL (ref 6.4–8.2)
MCH RBC QN AUTO: 30.2 PG (ref 26–34)
MCHC RBC AUTO-ENTMCNC: 33.3 G/DL (ref 31–37)
MCV RBC AUTO: 90.7 FL (ref 80–100)
MONOCYTES # BLD AUTO: 0.59 X10(3) UL (ref 0.1–1)
MONOCYTES NFR BLD AUTO: 7.6 %
NEUTROPHILS # BLD AUTO: 4.76 X10 (3) UL (ref 1.5–7.7)
NEUTROPHILS # BLD AUTO: 4.76 X10(3) UL (ref 1.5–7.7)
NEUTROPHILS NFR BLD AUTO: 61.7 %
NITRITE UR QL STRIP.AUTO: NEGATIVE
OSMOLALITY SERPL CALC.SUM OF ELEC: 290 MOSM/KG (ref 275–295)
PH UR STRIP.AUTO: 7 [PH] (ref 4.5–8)
PLATELET # BLD AUTO: 251 10(3)UL (ref 150–450)
POTASSIUM SERPL-SCNC: 4.1 MMOL/L (ref 3.5–5.1)
PROT UR STRIP.AUTO-MCNC: NEGATIVE MG/DL
RBC # BLD AUTO: 4.53 X10(6)UL (ref 3.8–5.3)
SODIUM SERPL-SCNC: 138 MMOL/L (ref 136–145)
SP GR UR STRIP.AUTO: 1.01 (ref 1–1.03)
TROPONIN I SERPL-MCNC: <0.045 NG/ML (ref ?–0.04)
UROBILINOGEN UR STRIP.AUTO-MCNC: <2 MG/DL
WBC # BLD AUTO: 7.7 X10(3) UL (ref 4–11)

## 2019-07-31 PROCEDURE — 93005 ELECTROCARDIOGRAM TRACING: CPT

## 2019-07-31 PROCEDURE — 85025 COMPLETE CBC W/AUTO DIFF WBC: CPT | Performed by: EMERGENCY MEDICINE

## 2019-07-31 PROCEDURE — 93010 ELECTROCARDIOGRAM REPORT: CPT

## 2019-07-31 PROCEDURE — 84484 ASSAY OF TROPONIN QUANT: CPT | Performed by: EMERGENCY MEDICINE

## 2019-07-31 PROCEDURE — 36415 COLL VENOUS BLD VENIPUNCTURE: CPT

## 2019-07-31 PROCEDURE — 80053 COMPREHEN METABOLIC PANEL: CPT | Performed by: EMERGENCY MEDICINE

## 2019-07-31 PROCEDURE — 71045 X-RAY EXAM CHEST 1 VIEW: CPT | Performed by: EMERGENCY MEDICINE

## 2019-07-31 PROCEDURE — 70450 CT HEAD/BRAIN W/O DYE: CPT | Performed by: EMERGENCY MEDICINE

## 2019-07-31 PROCEDURE — 99285 EMERGENCY DEPT VISIT HI MDM: CPT

## 2019-07-31 PROCEDURE — 99284 EMERGENCY DEPT VISIT MOD MDM: CPT

## 2019-07-31 PROCEDURE — 81001 URINALYSIS AUTO W/SCOPE: CPT | Performed by: EMERGENCY MEDICINE

## 2019-07-31 NOTE — ED INITIAL ASSESSMENT (HPI)
Pt to ed from home with c/o a fall walking in her home kitchen, pt unsure of what happen, found her self on the floor, pt has bump to back of her head, lac to l elbow, pt arrival to ed aaox3, ha.

## 2019-07-31 NOTE — CM/SW NOTE
Provided pt and daughter assisted living facility resources and private duty care giver resources since pt continues to want to be at home following a fall today. She wants to hire some private duty caregivers to help her at home.  Patient and daughter agre

## 2019-08-01 LAB
ATRIAL RATE: 95 BPM
P AXIS: 59 DEGREES
P-R INTERVAL: 180 MS
Q-T INTERVAL: 360 MS
QRS DURATION: 84 MS
QTC CALCULATION (BEZET): 452 MS
R AXIS: 1 DEGREES
T AXIS: 51 DEGREES
VENTRICULAR RATE: 95 BPM

## 2019-08-01 NOTE — ED PROVIDER NOTES
Patient Seen in: [facility] Emergency Department    History   Patient presents with:  Fall (musculoskeletal, neurologic)  Head Neck Injury (neurologic, musculoskeletal)    Stated Complaint: Jarod Clark    HPI    25-year-old female presents to t Other systems are as noted in HPI. Constitutional and vital signs reviewed. All other systems reviewed and negative except as noted above.     Physical Exam     ED Triage Vitals   BP 07/31/19 1915 (!) 213/94   Pulse 07/31/19 1830 86   Resp 07/31/19 18 CBC WITH DIFFERENTIAL WITH PLATELET    Narrative: The following orders were created for panel order CBC WITH DIFFERENTIAL WITH PLATELET.   Procedure                               Abnormality         Status                     --------- PROCEDURE:  CT BRAIN OR HEAD ([id number])  COMPARISON:  Lesley Cristina, CT BRAIN OR HEAD ([id number]), 5/27/2018, 11:26. INDICATIONS:  SYNCOPAL EPISODE  TECHNIQUE:  Noncontrast CT scanning is performed through the brain. Dose reduction techniques were used.  Dose infor Patient had IV established and blood work obtained she had a work-up that included a CT scan of her head which was normal.  No obvious acute injuries. She was also seen by the . She had a sepsis of the wound on her elbow.   I reviewed all the

## 2019-08-02 ENCOUNTER — TELEPHONE (OUTPATIENT)
Dept: CARDIOLOGY | Age: 84
End: 2019-08-02

## 2019-08-07 ENCOUNTER — OFFICE VISIT (OUTPATIENT)
Dept: INTERNAL MEDICINE CLINIC | Facility: CLINIC | Age: 84
End: 2019-08-07
Payer: MEDICARE

## 2019-08-07 VITALS
DIASTOLIC BLOOD PRESSURE: 80 MMHG | HEIGHT: 59.25 IN | WEIGHT: 128.5 LBS | SYSTOLIC BLOOD PRESSURE: 144 MMHG | RESPIRATION RATE: 20 BRPM | BODY MASS INDEX: 25.91 KG/M2 | HEART RATE: 72 BPM | TEMPERATURE: 98 F

## 2019-08-07 DIAGNOSIS — R55 SYNCOPE, UNSPECIFIED SYNCOPE TYPE: Primary | ICD-10-CM

## 2019-08-07 DIAGNOSIS — M54.2 ACUTE NECK PAIN: ICD-10-CM

## 2019-08-07 DIAGNOSIS — M47.812 SPONDYLOSIS OF CERVICAL REGION WITHOUT MYELOPATHY OR RADICULOPATHY: ICD-10-CM

## 2019-08-07 DIAGNOSIS — I10 ESSENTIAL HYPERTENSION: Chronic | ICD-10-CM

## 2019-08-07 DIAGNOSIS — S51.012D LACERATION OF LEFT ELBOW, SUBSEQUENT ENCOUNTER: ICD-10-CM

## 2019-08-07 DIAGNOSIS — R60.0 BILATERAL EDEMA OF LOWER EXTREMITY: ICD-10-CM

## 2019-08-07 PROCEDURE — 1111F DSCHRG MED/CURRENT MED MERGE: CPT | Performed by: INTERNAL MEDICINE

## 2019-08-07 PROCEDURE — 99214 OFFICE O/P EST MOD 30 MIN: CPT | Performed by: INTERNAL MEDICINE

## 2019-08-07 RX ORDER — MELOXICAM 7.5 MG/1
7.5 TABLET ORAL 2 TIMES DAILY PRN
Qty: 45 TABLET | Refills: 0 | Status: SHIPPED | OUTPATIENT
Start: 2019-08-07 | End: 2019-10-01

## 2019-08-07 NOTE — PROGRESS NOTES
Edy Izquierdo is a 80year old female. HPI:7/31   Patient presents with:  Hospital F/U  Fainting    Patient presents for follow up from recent emergency department visit. [facility] on 7/31 after experiencing a syncopal episode.   She w mouth 2 (two) times daily as needed for Pain., Disp: 45 tablet, Rfl: 0  •  OXYBUTYNIN CHLORIDE ER 5 MG Oral Tablet 24 Hr, TAKE 1 TABLET DAILY, Disp: 90 tablet, Rfl: 1  •  Losartan Potassium 50 MG Oral Tab, Take 50 mg by mouth nightly.  , Disp: , Rfl:   • pleasant, appropriate mood and affect  ASSESSMENT AND PLAN:   1. Syncope, unspecified syncope type  Patient had a syncopal episode on 7/31. This was preceded by standing for a long period while opening the mail.   She has had several syncopal episodes in t consider referral for lymphedema therapy. Patient Care Team:  Chuck Mejia MD as PCP - General (Internal Medicine)  Chuck Mejia MD [facility]  The patient indicates understanding of these issues and agrees to the plan.   The patient is asked

## 2019-08-07 NOTE — PATIENT INSTRUCTIONS
- Start prescription anti-inflammatory (meloxicam). Take twice daily as needed. Take with food. - Ok to take Tylenol along with it, but do not take anything else over the counter.   - If your neck is not better after finishing the medication, schedule

## 2019-10-01 NOTE — TELEPHONE ENCOUNTER
Meloxicam 7.5 mg 1 tab bid prn filled 8-7-19 45 tab with 0 refills     LOV 8-7-19   We can try a short course of meloxicam for next 2-3 weeks   return to clinic in 3-6 months   No upcoming apt on file   Labs 7-31-19

## 2019-10-02 NOTE — TELEPHONE ENCOUNTER
Spoke to pt - she states taking the Meloxicam did help her a little. She stopped and restarted again recently but she is now all out. Advised her to follow up with pain medicine clinic - provided with contact information.

## 2019-10-02 NOTE — TELEPHONE ENCOUNTER
Is she still having neck pain? If she is, she should schedule appointment with Pain management clinic (Dr. Lydia Mcgowan).

## 2019-10-03 RX ORDER — MELOXICAM 7.5 MG/1
7.5 TABLET ORAL 2 TIMES DAILY PRN
Qty: 45 TABLET | Refills: 0 | Status: SHIPPED | OUTPATIENT
Start: 2019-10-03 | End: 2019-10-08 | Stop reason: ALTCHOICE

## 2019-10-03 NOTE — TELEPHONE ENCOUNTER
Ok - I see she has an appointment on the 8th - will refill meloxicam while she is waiting to see them.

## 2019-10-08 ENCOUNTER — OFFICE VISIT (OUTPATIENT)
Dept: PAIN CLINIC | Facility: CLINIC | Age: 84
End: 2019-10-08
Payer: MEDICARE

## 2019-10-08 VITALS
HEIGHT: 61 IN | DIASTOLIC BLOOD PRESSURE: 70 MMHG | OXYGEN SATURATION: 97 % | BODY MASS INDEX: 24.55 KG/M2 | HEART RATE: 85 BPM | WEIGHT: 130 LBS | SYSTOLIC BLOOD PRESSURE: 170 MMHG

## 2019-10-08 DIAGNOSIS — M50.30 DDD (DEGENERATIVE DISC DISEASE), CERVICAL: Primary | ICD-10-CM

## 2019-10-08 PROCEDURE — 99203 OFFICE O/P NEW LOW 30 MIN: CPT | Performed by: ANESTHESIOLOGY

## 2019-10-08 RX ORDER — MELOXICAM 7.5 MG/1
7.5 TABLET ORAL DAILY
Qty: 14 TABLET | Refills: 0 | Status: SHIPPED | OUTPATIENT
Start: 2019-10-08 | End: 2020-12-04

## 2019-10-08 NOTE — PROGRESS NOTES
PHYSICAL EXAM:   Physical Exam   Constitutional:           Patient presents in office today with reported pain in neck, back of the head    Current pain level reported = 5/10    Location of Pain:  neck, back of the head    Date Pain Began: 8/2019

## 2019-10-08 NOTE — H&P
Name: Herb Owusu   : 1928   DOS: 10/8/2019     Chief complaint: Neck pain    History of present illness:  Herb Owusu is a 80year old female referred for evaluation of neck pain since 2019.   The patient stated, that she fell while • Glaucoma Neg    • Macular degeneration Neg      Social History    Tobacco Use      Smoking status: Never Smoker      Smokeless tobacco: Never Used    Alcohol use:  Yes      Alcohol/week: 2.0 standard drinks      Types: 2 Glasses of wine per week    Drug after suffering a fall. At this point, she is doing quite well with mild discomfort in the posterior neck which is likely secondary to the mechanical fall in combination with multilevel degenerative disc disease in the cervical spine.   I discussed eunice

## 2020-03-10 ENCOUNTER — OFFICE VISIT (OUTPATIENT)
Dept: CARDIOLOGY | Age: 85
End: 2020-03-10

## 2020-03-10 VITALS
HEIGHT: 62 IN | SYSTOLIC BLOOD PRESSURE: 136 MMHG | DIASTOLIC BLOOD PRESSURE: 82 MMHG | WEIGHT: 129 LBS | BODY MASS INDEX: 23.74 KG/M2 | HEART RATE: 107 BPM

## 2020-03-10 DIAGNOSIS — Z87.898 HISTORY OF SYNCOPE: ICD-10-CM

## 2020-03-10 DIAGNOSIS — I25.10 ASHD (ARTERIOSCLEROTIC HEART DISEASE): ICD-10-CM

## 2020-03-10 DIAGNOSIS — I87.2 VENOUS INSUFFICIENCY (CHRONIC) (PERIPHERAL): Primary | ICD-10-CM

## 2020-03-10 DIAGNOSIS — I95.1 ORTHOSTATIC HYPOTENSION: ICD-10-CM

## 2020-03-10 DIAGNOSIS — I10 ESSENTIAL HYPERTENSION: ICD-10-CM

## 2020-03-10 DIAGNOSIS — E78.00 PURE HYPERCHOLESTEROLEMIA: ICD-10-CM

## 2020-03-10 PROCEDURE — 93000 ELECTROCARDIOGRAM COMPLETE: CPT | Performed by: INTERNAL MEDICINE

## 2020-03-10 PROCEDURE — 99214 OFFICE O/P EST MOD 30 MIN: CPT | Performed by: INTERNAL MEDICINE

## 2020-03-10 RX ORDER — LOSARTAN POTASSIUM 25 MG/1
25 TABLET ORAL DAILY
Qty: 90 TABLET | Refills: 3 | Status: SHIPPED | OUTPATIENT
Start: 2020-03-10 | End: 2020-03-16 | Stop reason: SDUPTHER

## 2020-03-10 RX ORDER — LOSARTAN POTASSIUM 50 MG/1
50 TABLET ORAL DAILY
Qty: 90 TABLET | Refills: 3 | Status: SHIPPED | OUTPATIENT
Start: 2020-03-10 | End: 2020-03-13 | Stop reason: SDUPTHER

## 2020-03-10 RX ORDER — LOSARTAN POTASSIUM 50 MG/1
50 TABLET ORAL DAILY
COMMUNITY
End: 2020-03-10 | Stop reason: SDUPTHER

## 2020-03-10 RX ORDER — OXYBUTYNIN CHLORIDE 5 MG/1
5 TABLET ORAL DAILY
Qty: 90 TABLET | Refills: 3 | Status: CANCELLED | OUTPATIENT
Start: 2020-03-10

## 2020-03-10 SDOH — HEALTH STABILITY: PHYSICAL HEALTH: ON AVERAGE, HOW MANY DAYS PER WEEK DO YOU ENGAGE IN MODERATE TO STRENUOUS EXERCISE (LIKE A BRISK WALK)?: 0 DAYS

## 2020-03-10 SDOH — HEALTH STABILITY: PHYSICAL HEALTH: ON AVERAGE, HOW MANY MINUTES DO YOU ENGAGE IN EXERCISE AT THIS LEVEL?: 0 MIN

## 2020-03-10 SDOH — SOCIAL STABILITY: SOCIAL NETWORK: ARE YOU MARRIED, WIDOWED, DIVORCED, SEPARATED, NEVER MARRIED, OR LIVING WITH A PARTNER?: WIDOWED

## 2020-03-10 ASSESSMENT — PATIENT HEALTH QUESTIONNAIRE - PHQ9
1. LITTLE INTEREST OR PLEASURE IN DOING THINGS: NOT AT ALL
SUM OF ALL RESPONSES TO PHQ9 QUESTIONS 1 AND 2: 0
2. FEELING DOWN, DEPRESSED OR HOPELESS: NOT AT ALL
SUM OF ALL RESPONSES TO PHQ9 QUESTIONS 1 AND 2: 0

## 2020-03-13 RX ORDER — LOSARTAN POTASSIUM 50 MG/1
50 TABLET ORAL EVERY EVENING
Qty: 90 TABLET | Refills: 3 | Status: SHIPPED | OUTPATIENT
Start: 2020-03-13 | End: 2020-03-16 | Stop reason: SDUPTHER

## 2020-03-16 RX ORDER — LOSARTAN POTASSIUM 25 MG/1
25 TABLET ORAL DAILY
Qty: 90 TABLET | Refills: 3 | Status: SHIPPED | OUTPATIENT
Start: 2020-03-16

## 2020-03-16 RX ORDER — LOSARTAN POTASSIUM 50 MG/1
50 TABLET ORAL EVERY EVENING
Qty: 90 TABLET | Refills: 3 | Status: SHIPPED | OUTPATIENT
Start: 2020-03-16

## 2020-07-20 ENCOUNTER — TELEPHONE (OUTPATIENT)
Dept: CARDIOLOGY | Age: 85
End: 2020-07-20

## 2020-08-24 ENCOUNTER — TELEPHONE (OUTPATIENT)
Dept: CARDIOLOGY | Age: 85
End: 2020-08-24

## 2020-12-04 ENCOUNTER — APPOINTMENT (OUTPATIENT)
Dept: CT IMAGING | Facility: HOSPITAL | Age: 85
End: 2020-12-04
Attending: EMERGENCY MEDICINE
Payer: MEDICARE

## 2020-12-04 ENCOUNTER — APPOINTMENT (OUTPATIENT)
Dept: GENERAL RADIOLOGY | Facility: HOSPITAL | Age: 85
End: 2020-12-04
Attending: EMERGENCY MEDICINE
Payer: MEDICARE

## 2020-12-04 ENCOUNTER — HOSPITAL ENCOUNTER (OUTPATIENT)
Facility: HOSPITAL | Age: 85
Setting detail: OBSERVATION
Discharge: NURSING, REHAB OR LONG-TERM CARE | End: 2020-12-07
Attending: EMERGENCY MEDICINE | Admitting: HOSPITALIST
Payer: MEDICARE

## 2020-12-04 DIAGNOSIS — R55 SYNCOPE AND COLLAPSE: Primary | ICD-10-CM

## 2020-12-04 DIAGNOSIS — S32.010A CLOSED WEDGE COMPRESSION FRACTURE OF L1 VERTEBRA, INITIAL ENCOUNTER (HCC): ICD-10-CM

## 2020-12-04 DIAGNOSIS — M54.89 BACK PAIN WITHOUT SCIATICA: ICD-10-CM

## 2020-12-04 LAB
ALBUMIN SERPL-MCNC: 3.4 G/DL (ref 3.4–5)
ALBUMIN/GLOB SERPL: 1.1 {RATIO} (ref 1–2)
ALP LIVER SERPL-CCNC: 88 U/L
ALT SERPL-CCNC: 19 U/L
ANION GAP SERPL CALC-SCNC: 5 MMOL/L (ref 0–18)
AST SERPL-CCNC: 17 U/L (ref 15–37)
ATRIAL RATE: 80 BPM
BASOPHILS # BLD AUTO: 0.04 X10(3) UL (ref 0–0.2)
BASOPHILS NFR BLD AUTO: 0.4 %
BILIRUB SERPL-MCNC: 0.4 MG/DL (ref 0.1–2)
BILIRUB UR QL STRIP.AUTO: NEGATIVE
BUN BLD-MCNC: 22 MG/DL (ref 7–18)
BUN/CREAT SERPL: 21.8 (ref 10–20)
CALCIUM BLD-MCNC: 9.1 MG/DL (ref 8.5–10.1)
CHLORIDE SERPL-SCNC: 107 MMOL/L (ref 98–112)
CLARITY UR REFRACT.AUTO: CLEAR
CO2 SERPL-SCNC: 27 MMOL/L (ref 21–32)
CREAT BLD-MCNC: 1.01 MG/DL
DEPRECATED RDW RBC AUTO: 44.6 FL (ref 35.1–46.3)
EOSINOPHIL # BLD AUTO: 0.04 X10(3) UL (ref 0–0.7)
EOSINOPHIL NFR BLD AUTO: 0.4 %
ERYTHROCYTE [DISTWIDTH] IN BLOOD BY AUTOMATED COUNT: 13.3 % (ref 11–15)
GLOBULIN PLAS-MCNC: 3 G/DL (ref 2.8–4.4)
GLUCOSE BLD-MCNC: 110 MG/DL (ref 70–99)
GLUCOSE UR STRIP.AUTO-MCNC: NEGATIVE MG/DL
HCT VFR BLD AUTO: 41.7 %
HGB BLD-MCNC: 13.5 G/DL
IMM GRANULOCYTES # BLD AUTO: 0.09 X10(3) UL (ref 0–1)
IMM GRANULOCYTES NFR BLD: 0.9 %
LEUKOCYTE ESTERASE UR QL STRIP.AUTO: NEGATIVE
LYMPHOCYTES # BLD AUTO: 1.72 X10(3) UL (ref 1–4)
LYMPHOCYTES NFR BLD AUTO: 16.9 %
M PROTEIN MFR SERPL ELPH: 6.4 G/DL (ref 6.4–8.2)
MCH RBC QN AUTO: 29.5 PG (ref 26–34)
MCHC RBC AUTO-ENTMCNC: 32.4 G/DL (ref 31–37)
MCV RBC AUTO: 91.2 FL
MONOCYTES # BLD AUTO: 0.65 X10(3) UL (ref 0.1–1)
MONOCYTES NFR BLD AUTO: 6.4 %
NEUTROPHILS # BLD AUTO: 7.65 X10 (3) UL (ref 1.5–7.7)
NEUTROPHILS # BLD AUTO: 7.65 X10(3) UL (ref 1.5–7.7)
NEUTROPHILS NFR BLD AUTO: 75 %
NITRITE UR QL STRIP.AUTO: NEGATIVE
OSMOLALITY SERPL CALC.SUM OF ELEC: 292 MOSM/KG (ref 275–295)
P AXIS: 69 DEGREES
P-R INTERVAL: 162 MS
PH UR STRIP.AUTO: 7 [PH] (ref 4.5–8)
PLATELET # BLD AUTO: 250 10(3)UL (ref 150–450)
POTASSIUM SERPL-SCNC: 4 MMOL/L (ref 3.5–5.1)
PROT UR STRIP.AUTO-MCNC: NEGATIVE MG/DL
Q-T INTERVAL: 408 MS
QRS DURATION: 82 MS
QTC CALCULATION (BEZET): 470 MS
R AXIS: 32 DEGREES
RBC # BLD AUTO: 4.57 X10(6)UL
SARS-COV-2 RNA RESP QL NAA+PROBE: NOT DETECTED
SODIUM SERPL-SCNC: 139 MMOL/L (ref 136–145)
SP GR UR STRIP.AUTO: 1.01 (ref 1–1.03)
T AXIS: 61 DEGREES
TROPONIN I SERPL-MCNC: <0.045 NG/ML (ref ?–0.04)
UROBILINOGEN UR STRIP.AUTO-MCNC: <2 MG/DL
VENTRICULAR RATE: 80 BPM
WBC # BLD AUTO: 10.2 X10(3) UL (ref 4–11)

## 2020-12-04 PROCEDURE — 74176 CT ABD & PELVIS W/O CONTRAST: CPT | Performed by: EMERGENCY MEDICINE

## 2020-12-04 PROCEDURE — 71045 X-RAY EXAM CHEST 1 VIEW: CPT | Performed by: EMERGENCY MEDICINE

## 2020-12-04 PROCEDURE — 99220 INITIAL OBSERVATION CARE,LEVL III: CPT | Performed by: INTERNAL MEDICINE

## 2020-12-04 PROCEDURE — 72100 X-RAY EXAM L-S SPINE 2/3 VWS: CPT | Performed by: EMERGENCY MEDICINE

## 2020-12-04 PROCEDURE — 99215 OFFICE O/P EST HI 40 MIN: CPT | Performed by: INTERNAL MEDICINE

## 2020-12-04 PROCEDURE — 72125 CT NECK SPINE W/O DYE: CPT | Performed by: EMERGENCY MEDICINE

## 2020-12-04 PROCEDURE — 70450 CT HEAD/BRAIN W/O DYE: CPT | Performed by: EMERGENCY MEDICINE

## 2020-12-04 RX ORDER — LOSARTAN POTASSIUM 25 MG/1
50 TABLET ORAL NIGHTLY
Status: DISCONTINUED | OUTPATIENT
Start: 2020-12-04 | End: 2020-12-07

## 2020-12-04 RX ORDER — ACETAMINOPHEN 500 MG
1000 TABLET ORAL ONCE
Status: COMPLETED | OUTPATIENT
Start: 2020-12-04 | End: 2020-12-04

## 2020-12-04 RX ORDER — HEPARIN SODIUM 5000 [USP'U]/ML
5000 INJECTION, SOLUTION INTRAVENOUS; SUBCUTANEOUS EVERY 12 HOURS SCHEDULED
Status: DISCONTINUED | OUTPATIENT
Start: 2020-12-04 | End: 2020-12-07

## 2020-12-04 RX ORDER — ACETAMINOPHEN 325 MG/1
650 TABLET ORAL EVERY 6 HOURS PRN
Status: DISCONTINUED | OUTPATIENT
Start: 2020-12-04 | End: 2020-12-07

## 2020-12-04 RX ORDER — LOSARTAN POTASSIUM 25 MG/1
25 TABLET ORAL EVERY MORNING
Status: DISCONTINUED | OUTPATIENT
Start: 2020-12-05 | End: 2020-12-07

## 2020-12-04 RX ORDER — SODIUM CHLORIDE 9 MG/ML
125 INJECTION, SOLUTION INTRAVENOUS CONTINUOUS
Status: DISCONTINUED | OUTPATIENT
Start: 2020-12-04 | End: 2020-12-04

## 2020-12-04 RX ORDER — HYDRALAZINE HYDROCHLORIDE 20 MG/ML
10 INJECTION INTRAMUSCULAR; INTRAVENOUS EVERY 6 HOURS PRN
Status: DISCONTINUED | OUTPATIENT
Start: 2020-12-04 | End: 2020-12-07

## 2020-12-04 RX ORDER — ACETAMINOPHEN 500 MG
500 TABLET ORAL EVERY 6 HOURS PRN
COMMUNITY
End: 2020-12-11

## 2020-12-04 RX ORDER — ONDANSETRON 2 MG/ML
4 INJECTION INTRAMUSCULAR; INTRAVENOUS EVERY 6 HOURS PRN
Status: DISCONTINUED | OUTPATIENT
Start: 2020-12-04 | End: 2020-12-07

## 2020-12-04 RX ORDER — ACETAMINOPHEN 500 MG
TABLET ORAL
Status: COMPLETED
Start: 2020-12-04 | End: 2020-12-04

## 2020-12-04 NOTE — ED PROVIDER NOTES
Patient Seen in: [facility] Emergency Department      History   Patient presents with:  Fall    Stated Complaint: Fall    HPI    Patient reports syncope. Patient recalls standing in her kitchen. She had eaten breakfast earlier.   She was standing r clear.  Scalp is atraumatic and nontender  Eyes: sclera white, conjunctiva pink and moist, midrange pupils, equal round reactive to light, extraocular movements are intact.   Lids and lashes are normal.  Neck: nontender in midline  Lungs: Clear to auscultat changes to suggest acute ischemia or infarct  Ventricular rate 80 bpm. CT interval 162 ms. QRS duration 82 ms. MDM      Patient had a syncopal episode. No tongue biting or incontinence to suggest seizure.   She does not recall any sort of chest pain seen.    Admission disposition: 12/4/2020  2:06 PM         I discussed case with [facility] hospitalist, Dr. Ivet Rtuledge.   She will admit and make further recommendations,, and check on urinalysis                    Disposition and Plan     Clinical Impression:  Marcos

## 2020-12-04 NOTE — H&P
[facility]ITAL[facility]                                                               History & Physical         Theora Generous Patient Status:  Observation    1928 MRN [id number]   [facility] 2NE-A Attending Sean Maloney MD   [facility] Day # History   Problem Relation Age of Onset   • Heart Disorder Mother    • Hypertension Mother    • Heart Disorder Son 61        MI    • Diabetes Neg    • Glaucoma Neg    • Macular degeneration Neg       Reviewed    Social history:   reports that she has never Appropriate mood and affect.       Diagnostic Data:      Laboratory Data:   Lab Results   Component Value Date    WBC 10.2 12/04/2020    HGB 13.5 12/04/2020    HCT 41.7 12/04/2020    .0 12/04/2020    CREATSERUM 1.01 12/04/2020    BUN 22 12/04/2020 deep inspiration. FINDINGS:  The lungs are hyperinflated. Heart size and vascularity are normal.  No focal infiltrate, consolidation or pleural effusion. There is no pneumothorax. Atheromatous plaque is identified within the aorta.      No acute

## 2020-12-05 ENCOUNTER — APPOINTMENT (OUTPATIENT)
Dept: CV DIAGNOSTICS | Facility: HOSPITAL | Age: 85
End: 2020-12-05
Attending: INTERNAL MEDICINE
Payer: MEDICARE

## 2020-12-05 LAB
ANION GAP SERPL CALC-SCNC: 6 MMOL/L (ref 0–18)
BASOPHILS # BLD AUTO: 0.02 X10(3) UL (ref 0–0.2)
BASOPHILS NFR BLD AUTO: 0.3 %
BUN BLD-MCNC: 21 MG/DL (ref 7–18)
BUN/CREAT SERPL: 35 (ref 10–20)
CALCIUM BLD-MCNC: 8.5 MG/DL (ref 8.5–10.1)
CHLORIDE SERPL-SCNC: 109 MMOL/L (ref 98–112)
CO2 SERPL-SCNC: 26 MMOL/L (ref 21–32)
CREAT BLD-MCNC: 0.6 MG/DL
DEPRECATED RDW RBC AUTO: 44.2 FL (ref 35.1–46.3)
EOSINOPHIL # BLD AUTO: 0.01 X10(3) UL (ref 0–0.7)
EOSINOPHIL NFR BLD AUTO: 0.1 %
ERYTHROCYTE [DISTWIDTH] IN BLOOD BY AUTOMATED COUNT: 13.3 % (ref 11–15)
GLUCOSE BLD-MCNC: 104 MG/DL (ref 70–99)
HAV IGM SER QL: 2.2 MG/DL (ref 1.6–2.6)
HCT VFR BLD AUTO: 38.7 %
HGB BLD-MCNC: 12.5 G/DL
IMM GRANULOCYTES # BLD AUTO: 0.03 X10(3) UL (ref 0–1)
IMM GRANULOCYTES NFR BLD: 0.4 %
LYMPHOCYTES # BLD AUTO: 1.44 X10(3) UL (ref 1–4)
LYMPHOCYTES NFR BLD AUTO: 18 %
MCH RBC QN AUTO: 29.4 PG (ref 26–34)
MCHC RBC AUTO-ENTMCNC: 32.3 G/DL (ref 31–37)
MCV RBC AUTO: 91.1 FL
MONOCYTES # BLD AUTO: 0.58 X10(3) UL (ref 0.1–1)
MONOCYTES NFR BLD AUTO: 7.3 %
NEUTROPHILS # BLD AUTO: 5.91 X10 (3) UL (ref 1.5–7.7)
NEUTROPHILS # BLD AUTO: 5.91 X10(3) UL (ref 1.5–7.7)
NEUTROPHILS NFR BLD AUTO: 73.9 %
OSMOLALITY SERPL CALC.SUM OF ELEC: 295 MOSM/KG (ref 275–295)
PLATELET # BLD AUTO: 233 10(3)UL (ref 150–450)
POTASSIUM SERPL-SCNC: 3.3 MMOL/L (ref 3.5–5.1)
POTASSIUM SERPL-SCNC: 4.2 MMOL/L (ref 3.5–5.1)
RBC # BLD AUTO: 4.25 X10(6)UL
SODIUM SERPL-SCNC: 141 MMOL/L (ref 136–145)
WBC # BLD AUTO: 8 X10(3) UL (ref 4–11)

## 2020-12-05 PROCEDURE — 99215 OFFICE O/P EST HI 40 MIN: CPT | Performed by: NURSE PRACTITIONER

## 2020-12-05 PROCEDURE — 93306 TTE W/DOPPLER COMPLETE: CPT | Performed by: INTERNAL MEDICINE

## 2020-12-05 PROCEDURE — 99225 SUBSEQUENT OBSERVATION CARE: CPT | Performed by: INTERNAL MEDICINE

## 2020-12-05 RX ORDER — POTASSIUM CHLORIDE 20 MEQ/1
40 TABLET, EXTENDED RELEASE ORAL EVERY 4 HOURS
Status: COMPLETED | OUTPATIENT
Start: 2020-12-05 | End: 2020-12-05

## 2020-12-05 NOTE — PROGRESS NOTES
NURSING ADMISSION NOTE      Patient admitted via Cart  Oriented to room. Safety precautions initiated. Bed in low position. Call light in reach. Pt arrived to floor from ED after a syncopal episode at home.  States she was standing in kitchen open

## 2020-12-05 NOTE — PROGRESS NOTES
[facility]  Cardiology Progress Note    Edy Triana Patient Status:  Observation    1928 MRN [id number]   [facility] 2NE-A Attending Treasure Feliz MD   Hosp Day # 0 PCP Janet Agustin MD       Subjective:  No chest pain or s Q4H   • Heparin Sodium (Porcine)  5,000 Units Subcutaneous Q12H [facility]   • lidocaine-menthol  1 patch Transdermal Daily   • losartan Potassium  50 mg Oral Nightly   • Losartan Potassium  25 mg Oral QAM         Assessment:    • Fall- she does not believe she p

## 2020-12-05 NOTE — PLAN OF CARE
Problem: COPING  Goal: Pt/Family able to verbalize concerns and demonstrate effective coping strategies  Description: INTERVENTIONS:  - Assist patient/family to identify coping skills, available support systems and cultural and spiritual values  - Provid and vital signs for trends  - Administer supportive blood products/factors, fluids and medications as ordered and appropriate  - Administer supportive blood products/factors as ordered and appropriate  Outcome: Progressing    Patient a&ox4 on room air ,den

## 2020-12-05 NOTE — PLAN OF CARE
Rec in bed pt and VS appear stable. Denies CP/SOB sats maintained in 90's on RA. Ana ([phone]) called this a.m. to have lumbar brace delivered spoke with a rep named Glenis Manrique. Pt has significant pain in lower back area.  meds per order some relief no

## 2020-12-05 NOTE — PHYSICAL THERAPY NOTE
PHYSICAL THERAPY EVALUATION - INPATIENT     Room Number: 9425/3351-W  Evaluation Date: 12/5/2020  Type of Evaluation: Initial  Physician Order: PT Eval and Treat    Presenting Problem: fall, back pain  Reason for Therapy: Mobility Dysfunction and Dis Lumbar brace(when delivered )  Fall Risk: Standard fall risk    WEIGHT BEARING RESTRICTION  Weight Bearing Restriction: None                PAIN ASSESSMENT  Ratin  Location: LB  Management Techniques:  Activity promotion;Relaxation;Repositioning    COGN Provided: Received patient sitting up in the recliner chair, patient performed sit<>stand cga-SBA with the RW, patient ambulated inside the room cga, patient performed sit->supine via log roll technique needing mod assist to B LE & log roll technique needs sitting in the chair as well as during functional mobility tasks, impaired gait tolerance  Functional outcome measures completed include AM-PAC 6 click & patient showed 46.58% impairment.   Based on this evaluation, patient's clinical presentation is evolvi

## 2020-12-05 NOTE — CONSULTS
[facility]     NAME: Светлана Farrell - ROOM: 2942/3727-E - MRN: [id number] - Age: 80year old - OPS:6/92/5592  Date of Admission: 12/4/2020 11:15 AM  Admission Diagnosis: Syncope and collapse [R55]  Back pain without sciatica Smokeless tobacco: Never Used    Alcohol use:  Yes      Alcohol/week: 2.0 standard drinks      Types: 2 Glasses of wine per week      Family History of Heart Disease:   Family History   Problem Relation Age of Onset   • Heart Disorder Mother    • Hypertensi hypotension. She took her medications as per usual.  On physical exam she appears to be mildly dehydrated and was orthostatic at the time of her presentation. IV fluids would be appropriate.   EKG shows normal sinus rhythm with no obvious ST segment abnor

## 2020-12-06 PROCEDURE — 99214 OFFICE O/P EST MOD 30 MIN: CPT | Performed by: INTERNAL MEDICINE

## 2020-12-06 PROCEDURE — 99225 SUBSEQUENT OBSERVATION CARE: CPT | Performed by: INTERNAL MEDICINE

## 2020-12-06 NOTE — PROGRESS NOTES
[facility]  Progress Note    Eduardo Jean Baptiste Patient Status:  Observation    1928 MRN [id number]   [facility] 2NE-A Attending Reema Bar MD   Hosp Day # 0 PCP Marisa Chang MD     CC: Syncope and collapse    SUBJECTIVE:  Dillan Jacobo 5,000 Units, Subcutaneous, Q12H [address]    •  acetaminophen (TYLENOL) tab 650 mg, 650 mg, Oral, Q6H PRN    •  ondansetron HCl (ZOFRAN) injection 4 mg, 4 mg, Intravenous, Q6H PRN    •  lidocaine-menthol 4-1 % 1 patch, 1 patch, Transdermal, Daily    •  Losartan

## 2020-12-06 NOTE — CM/SW NOTE
10:17am  MSW spoke to pt and she is agreeable to brittny. Referrals pending      9:40am  MSW reviewed chart. Pt rec says BRITTNY. DON requested and BRITTNY started. Will need to give referrals when known.

## 2020-12-06 NOTE — PLAN OF CARE
Patient alert and period of confution  And forgetfulness, sinus on tele, on room air , no acute distress noted, plan of care updated, elevated bp hydralazine x1 helped , all care explained and made comfortable, will continue o monitor closely status with consideration of family and cultural values  - Communicate willingness to discuss loss and facilitate grief process with patient/family as appropriate  - Emphasize sustaining relationships within family system and community, or chicho/spiritual Interventions:  - Assess patient's functional ability and stability  - Promote increasing activity/tolerance for mobility and gait  - Educate and engage patient/family in tolerated activity level and precaution  Outcome: Progressing

## 2020-12-06 NOTE — PROGRESS NOTES
[facility]  Progress Note    Michele Husain Patient Status:  Observation    1928 MRN [id number]   [facility] 2NE-A Attending Stanley Fleischer, MD   Hosp Day # 0 PCP Kenny Jean MD     CC: Syncope and collapse    SUBJECTIVE:  Cleo Bright  12/05/2020    K 4.2 12/05/2020     12/05/2020    CO2 26.0 12/05/2020     12/05/2020    CA 8.5 12/05/2020    MG 2.2 12/05/2020       Imaging:  Imaging reviewed in Epic.     Meds:     •  metoprolol tartrate (LOPRESSOR) partial tablet 12 discussed with patient and RN            Chacho Jackson MD  12/5/2020

## 2020-12-07 VITALS
HEIGHT: 62 IN | DIASTOLIC BLOOD PRESSURE: 56 MMHG | WEIGHT: 121.88 LBS | SYSTOLIC BLOOD PRESSURE: 131 MMHG | RESPIRATION RATE: 18 BRPM | HEART RATE: 82 BPM | BODY MASS INDEX: 22.43 KG/M2 | TEMPERATURE: 98 F | OXYGEN SATURATION: 99 %

## 2020-12-07 PROCEDURE — 99217 OBSERVATION CARE DISCHARGE: CPT | Performed by: INTERNAL MEDICINE

## 2020-12-07 RX ORDER — DOCUSATE SODIUM 100 MG/1
100 CAPSULE, LIQUID FILLED ORAL 2 TIMES DAILY
Status: DISCONTINUED | OUTPATIENT
Start: 2020-12-07 | End: 2020-12-07

## 2020-12-07 RX ORDER — METOPROLOL SUCCINATE 25 MG/1
12.5 TABLET, EXTENDED RELEASE ORAL
Qty: 30 TABLET | Refills: 0 | Status: SHIPPED | OUTPATIENT
Start: 2020-12-08 | End: 2021-02-09

## 2020-12-07 RX ORDER — BISACODYL 10 MG
10 SUPPOSITORY, RECTAL RECTAL
Status: DISCONTINUED | OUTPATIENT
Start: 2020-12-07 | End: 2020-12-07

## 2020-12-07 NOTE — PLAN OF CARE
Pt received this am in bed assisted to chair with SBA after lumbar brace applied. Pt denies pain Lidoderm patch applied per order. Pt with lungs clear RA sat 95%   Tele NSR.  Physical therapy working with Pt ambulating in halls poc to [employer] called and upd

## 2020-12-07 NOTE — PHYSICAL THERAPY NOTE
PHYSICAL THERAPY TREATMENT NOTE - INPATIENT    Room Number: 1517/0734-G     Session: 1   Number of Visits to Meet Established Goals: 3    Presenting Problem: fall, back pain    Problem List  Principal Problem:    Syncope and collapse  Active Problems: bed (including adjusting bedclothes, sheets and blankets)?: A Little   -   Sitting down on and standing up from a chair with arms (e.g., wheelchair, bedside commode, etc.): A Little   -   Moving from lying on back to sitting on the side of the bed?: A Connor 5x/week    CURRENT GOALS      Goal #1 Patient is able to demonstrate supine - sit EOB @ level: cga via log roll technique      Goal #2 Patient is able to demonstrate transfers Sit to/from Stand at assistance level: modified independent      Goal #3 Patient

## 2020-12-07 NOTE — PLAN OF CARE
Problem: COPING  Goal: Pt/Family able to verbalize concerns and demonstrate effective coping strategies  Description: INTERVENTIONS:  - Assist patient/family to identify coping skills, available support systems and cultural and spiritual values  - Provid traditions  - Initiate Spiritual Care consult as needed  Outcome: Progressing     Problem: DECISION MAKING  Goal: Pt/Family able to effectively weigh alternatives and participate in decision making related to treatment and care  Description: INTERVENTIONS: puncture sites for bleeding and/or hematoma  - Assess quality of pulses, skin color and temperature  - Assess for signs of decreased coronary artery perfusion - ex.  Angina  - Evaluate fluid balance, assess for edema, trend weights  Outcome: Progressing  Go

## 2020-12-07 NOTE — CM/SW NOTE
12/07/20 1100   CM/SW Referral Data   Referral Source Physician   Reason for Referral Discharge planning   Informant Children   Patient Info   Patient's Mental Status Alert;Oriented   Patient's Home Environment [facility]   Patient lives with Alone   Lavon Oconnell

## 2020-12-07 NOTE — DISCHARGE SUMMARY
[facility]  Discharge Summary    Allison Lin Patient Status:  Observation    1928 MRN [id number]   [place] 2NE-A Attending No att. providers found   Hosp Day # 0 PCP Keila Haile MD     Date of Admission: 2020    Da fracture component anterior superior corner and slight offset from the posterior cortex. No central canal stenosis. The L2 mild compression   deformity appears chronic, no fracture line seen.    CT cervical spine done on 12/4/2020 with Stable degenerative Instructions Prescription details   Metoprolol Succinate ER 25 MG Tb24  Commonly known as: Toprol XL  Notes to patient: Decreased dose from 25 to 12.5      Take 0.5 tablets (12.5 mg total) by mouth Daily Beta Blocker.    Quantity: 30 tablet  Refills: 0 tenderness over the low back   Pulses: 2+ and symmetric  Skin: Skin color, texture, turgor normal. No rashes or lesions  Neurologic: Awake,alert,nonfocal  Psych: Mood and affect appears normal      Discharge Condition: stable    Patient Discharge Instructi

## 2020-12-07 NOTE — PROGRESS NOTES
[facility]  Progress Note    Aditya Dinero Patient Status:  Observation    1928 MRN [id number]   [facility] 2NE-A Attending Mau Buckner MD   Hosp Day # 0 PCP Severa Boop, MD     CC: Syncope and collapse    SUBJECTIVE:  Briseyda Downey Units, Subcutaneous, Q12H [address]    •  acetaminophen (TYLENOL) tab 650 mg, 650 mg, Oral, Q6H PRN    •  ondansetron HCl (ZOFRAN) injection 4 mg, 4 mg, Intravenous, Q6H PRN    •  lidocaine-menthol 4-1 % 1 patch, 1 patch, Transdermal, Daily    •  Losartan Potass

## 2020-12-07 NOTE — CM/SW NOTE
12/07/20 1344   Discharge disposition   Expected discharge disposition Skilled Nurs   Name of Facillity/Home Care/Hospice [facility]   Discharge transportation [employer]       [facility] list provided to son and pt. Son would like [facility] at [facility].

## 2020-12-08 ENCOUNTER — MOBILE ENCOUNTER (OUTPATIENT)
Dept: FAMILY MEDICINE CLINIC | Facility: CLINIC | Age: 85
End: 2020-12-08

## 2020-12-08 ENCOUNTER — EXTERNAL FACILITY (OUTPATIENT)
Dept: FAMILY MEDICINE CLINIC | Facility: CLINIC | Age: 85
End: 2020-12-08

## 2020-12-08 DIAGNOSIS — R55 SYNCOPE AND COLLAPSE: Primary | ICD-10-CM

## 2020-12-08 DIAGNOSIS — S32.010A CLOSED WEDGE COMPRESSION FRACTURE OF L1 VERTEBRA, INITIAL ENCOUNTER (HCC): ICD-10-CM

## 2020-12-08 DIAGNOSIS — I10 ESSENTIAL HYPERTENSION: ICD-10-CM

## 2020-12-08 DIAGNOSIS — M54.89 BACK PAIN WITHOUT SCIATICA: ICD-10-CM

## 2020-12-08 DIAGNOSIS — K59.09 CHRONIC CONSTIPATION: ICD-10-CM

## 2020-12-08 PROCEDURE — 99306 1ST NF CARE HIGH MDM 50: CPT | Performed by: FAMILY MEDICINE

## 2020-12-08 PROCEDURE — 1111F DSCHRG MED/CURRENT MED MERGE: CPT | Performed by: FAMILY MEDICINE

## 2020-12-08 NOTE — PROGRESS NOTES
Pt dc to home. Iv removed with cath intact tele dc'd pt received d/c instructions and follow up instructions given to  report called to [facility] Ext son don called to tell him pt was leaving and POC .

## 2020-12-08 NOTE — PROGRESS NOTES
Merlin Moreland Author: Laureen Ford MD     1928 MRN [id number]   [facility]  Admission 20      Last Hospital Discharge 20 PCP Edis Loya [facility] of Discharge  [facility]        CC --admitted to [facility] for subacute Mother    • Heart Disorder Son 61        MI    • Diabetes Neg    • Glaucoma Neg    • Macular degeneration Neg      Social History    Tobacco Use      Smoking status: Never Smoker      Smokeless tobacco: Never Used    Alcohol use:  Yes      Alcohol/week: 2.0 good BS's, no masses, HSM or tenderness  : deferred  RECTAL: deferred  MUSCULOSKELETAL: Tenderness onto the back at L2 and L1, no deformity, lidocaine patch in place  EXTREMITIES: no cyanosis, clubbing or edema  NEURO: Oriented times three, cranial nerve errors within this office note. If so, please bring any errors to my attention for an addendum.  All efforts were made to ensure that this office note is accurate

## 2020-12-09 ENCOUNTER — MOBILE ENCOUNTER (OUTPATIENT)
Dept: FAMILY MEDICINE CLINIC | Facility: CLINIC | Age: 85
End: 2020-12-09

## 2020-12-09 DIAGNOSIS — I25.10 CORONARY ARTERY DISEASE INVOLVING NATIVE CORONARY ARTERY OF NATIVE HEART WITHOUT ANGINA PECTORIS: ICD-10-CM

## 2020-12-09 DIAGNOSIS — S32.010A CLOSED WEDGE COMPRESSION FRACTURE OF L1 VERTEBRA, INITIAL ENCOUNTER (HCC): ICD-10-CM

## 2020-12-09 DIAGNOSIS — R55 SYNCOPE AND COLLAPSE: Primary | ICD-10-CM

## 2020-12-09 DIAGNOSIS — M54.89 BACK PAIN WITHOUT SCIATICA: ICD-10-CM

## 2020-12-09 DIAGNOSIS — K59.09 CHRONIC CONSTIPATION: ICD-10-CM

## 2020-12-09 DIAGNOSIS — I10 ESSENTIAL HYPERTENSION: ICD-10-CM

## 2020-12-09 PROCEDURE — 99358 PROLONG SERVICE W/O CONTACT: CPT | Performed by: FAMILY MEDICINE

## 2020-12-09 NOTE — PROGRESS NOTES
Edy Triana Author: Alejandra Mike MD     1928 MRN [id number]   [facility]  Admission 20      Last Hospital Discharge 20 PCP Edis Marc [facility] of Discharge  [facility]       Patient admitted to [facility] from [facility]

## 2020-12-10 ENCOUNTER — INITIAL APN SNF VISIT (OUTPATIENT)
Dept: INTERNAL MEDICINE CLINIC | Age: 85
End: 2020-12-10

## 2020-12-10 DIAGNOSIS — Z79.899 MEDICATION MANAGEMENT: ICD-10-CM

## 2020-12-10 DIAGNOSIS — I10 ESSENTIAL HYPERTENSION: ICD-10-CM

## 2020-12-10 DIAGNOSIS — Z79.899 PRESCRIPTION MEDICATION STARTED: ICD-10-CM

## 2020-12-10 DIAGNOSIS — K59.00 CONSTIPATION, UNSPECIFIED CONSTIPATION TYPE: ICD-10-CM

## 2020-12-10 DIAGNOSIS — M54.89 BACK PAIN WITHOUT SCIATICA: ICD-10-CM

## 2020-12-10 DIAGNOSIS — S32.010A COMPRESSION FRACTURE OF L1 VERTEBRA, INITIAL ENCOUNTER (HCC): Primary | ICD-10-CM

## 2020-12-10 DIAGNOSIS — Z91.81 HX OF FALLING: ICD-10-CM

## 2020-12-10 PROCEDURE — 99310 SBSQ NF CARE HIGH MDM 45: CPT | Performed by: NURSE PRACTITIONER

## 2020-12-11 VITALS
BODY MASS INDEX: 22 KG/M2 | DIASTOLIC BLOOD PRESSURE: 75 MMHG | WEIGHT: 121.81 LBS | SYSTOLIC BLOOD PRESSURE: 130 MMHG | TEMPERATURE: 97 F | HEART RATE: 92 BPM | OXYGEN SATURATION: 97 % | RESPIRATION RATE: 20 BRPM

## 2020-12-11 RX ORDER — LIDOCAINE 50 MG/G
PATCH TOPICAL EVERY 24 HOURS
COMMUNITY
End: 2021-02-16

## 2020-12-11 RX ORDER — HYDROCODONE BITARTRATE AND ACETAMINOPHEN 5; 325 MG/1; MG/1
1 TABLET ORAL EVERY 4 HOURS PRN
COMMUNITY
End: 2020-12-23 | Stop reason: ALTCHOICE

## 2020-12-11 RX ORDER — SENNOSIDES 8.6 MG
8.6 TABLET ORAL NIGHTLY
COMMUNITY
End: 2021-02-16

## 2020-12-11 RX ORDER — OMEPRAZOLE 20 MG/1
20 CAPSULE, DELAYED RELEASE ORAL EVERY MORNING
COMMUNITY
End: 2021-02-16

## 2020-12-11 RX ORDER — BISACODYL 10 MG
10 SUPPOSITORY, RECTAL RECTAL DAILY PRN
COMMUNITY
End: 2021-02-16

## 2020-12-11 RX ORDER — DOCUSATE SODIUM 100 MG/1
100 CAPSULE, LIQUID FILLED ORAL 2 TIMES DAILY
COMMUNITY
End: 2021-02-16

## 2020-12-11 RX ORDER — CYCLOBENZAPRINE HCL 5 MG
5 TABLET ORAL 3 TIMES DAILY PRN
COMMUNITY
End: 2020-12-23

## 2020-12-11 RX ORDER — POLYETHYLENE GLYCOL 3350 17 G/17G
17 POWDER, FOR SOLUTION ORAL DAILY PRN
COMMUNITY
End: 2021-02-16

## 2020-12-11 RX ORDER — NAPROXEN 500 MG/1
500 TABLET ORAL 2 TIMES DAILY PRN
COMMUNITY
End: 2021-02-16

## 2020-12-11 NOTE — PROGRESS NOTES
Eduardo Jean Baptiste  : 1928  Age 80year old  female patient is admitted to Facility: [facility] for [facility] date:  20  Discharge date to [facility]:  20  ELOS:  9-11 days  Anticipated discharge date:  20  [facility]  The L2 mild compression   deformity appears chronic, no fracture line seen.   CT cervical spine done on 12/4/2020 with Stable degenerative changes in the spine.  No sign of fracture or traumatic subluxation.   Orthostatics negative   2D echo- ejection frac Relation Age of Onset   • Heart Disorder Mother    • Hypertension Mother    • Heart Disorder Son 61        MI    • Diabetes Neg    • Glaucoma Neg    • Macular degeneration Neg      Social History    Tobacco Use      Smoking status: Never Smoker      Smokel seen in her room, sitting EOB.   Assisted her to [facility] and with repositioning brace with the assistance of her staff RN, [address] well otherwise  SKIN: denies any unusual skin lesions or rashes  WOUNDS: none   EYES:no visual complaints or d Component Value Date     (H) 12/05/2020    BUN 21 (H) 12/05/2020    BUNCREA 35.0 (H) 12/05/2020    CREATSERUM 0.60 12/05/2020    ANIONGAP 6 12/05/2020    GFR 72 10/08/2017    GFRNAA 79 12/05/2020    GFRAA 92 12/05/2020    CA 8.5 12/05/2020    OSMO test 12/4/20 Negative  Transmission based precautions x 14 days  Monitor for signs and symptoms of illness     L1-L2 acute compression fracture with back pain r/t fall at home, Osteoarthritis+  Brace to be worn while out of bed  PT/OT eval and treat  Pain

## 2020-12-15 ENCOUNTER — MED REC SCAN ONLY (OUTPATIENT)
Dept: FAMILY MEDICINE CLINIC | Facility: CLINIC | Age: 85
End: 2020-12-15

## 2020-12-15 ENCOUNTER — SNF VISIT (OUTPATIENT)
Dept: INTERNAL MEDICINE CLINIC | Age: 85
End: 2020-12-15

## 2020-12-15 DIAGNOSIS — Z91.81 HX OF FALLING: ICD-10-CM

## 2020-12-15 DIAGNOSIS — S32.010A COMPRESSION FRACTURE OF L1 VERTEBRA, INITIAL ENCOUNTER (HCC): Primary | ICD-10-CM

## 2020-12-15 DIAGNOSIS — Z74.1 REQUIRES ASSISTANCE WITH ACTIVITIES OF DAILY LIVING (ADL): ICD-10-CM

## 2020-12-15 PROCEDURE — 99308 SBSQ NF CARE LOW MDM 20: CPT | Performed by: NURSE PRACTITIONER

## 2020-12-16 ENCOUNTER — PATIENT OUTREACH (OUTPATIENT)
Dept: CASE MANAGEMENT | Age: 85
End: 2020-12-16

## 2020-12-16 VITALS
SYSTOLIC BLOOD PRESSURE: 124 MMHG | HEART RATE: 72 BPM | RESPIRATION RATE: 20 BRPM | TEMPERATURE: 97 F | DIASTOLIC BLOOD PRESSURE: 74 MMHG | OXYGEN SATURATION: 95 %

## 2020-12-16 NOTE — PROGRESS NOTES
Olga Whitaker, 2/21/1928, 80year old, female    Chief Complaint:  Patient presents with:   Follow - Up  Musculoskeletal Problem  Weakness  Lab Results     [address] [place] [address] [address] date:  12/4/20  Discharge date to [facility]:  12/7/20  ELOS:  9-11 days  Anticipate with transfers. This patient is moderate assistance and maximium assistance with toileting/personal hygiene. This patient is [facility] assistance with bathing. This patient requires contact guard assist with bed mobility.    [facility] is [facility] independence with ADLs  EXTREMITIES/VASCULAR: no cyanosis or edema per RN  NEUROLOGIC: follows commands  PSYCHIATRIC: alert and oriented x 3; affect appropriate, pleasant    Medications reviewed: Yes    Diagnostics reviewed:  12/15/20 in SNF:  CBC W/DIFF Final  Chloride 105 mEq/L  Final  CO2 22 mEq/L 23-31 L Final  Anion Gap 11 mmoles/L 8-16 Final  Calcium 9.1 mg/dL 8.2-9.6 Final  Total Protein 5.3 gm/dL 6.0-8.3 L Final  Albumin 3.5 gm/dL 3.5-5.0 Final  AST (SGOT) 28 IU/L 11-32 Final  ALT (SGPT) 53 I AM

## 2020-12-16 NOTE — PROGRESS NOTES
Uriel Nathan from [facility] ([phone], ext [phone]) called to see if Pt had any upcoming appts    LVM letting her know she does have an upcoming appt on 3- with Eliel Rm, Dr. Brittni De Oliveira (cardio)

## 2020-12-18 ENCOUNTER — SNF VISIT (OUTPATIENT)
Dept: INTERNAL MEDICINE CLINIC | Age: 85
End: 2020-12-18

## 2020-12-18 VITALS
TEMPERATURE: 98 F | OXYGEN SATURATION: 95 % | HEART RATE: 80 BPM | DIASTOLIC BLOOD PRESSURE: 60 MMHG | RESPIRATION RATE: 18 BRPM | SYSTOLIC BLOOD PRESSURE: 136 MMHG

## 2020-12-18 DIAGNOSIS — Z91.81 HX OF FALLING: ICD-10-CM

## 2020-12-18 DIAGNOSIS — Z74.1 REQUIRES ASSISTANCE WITH ACTIVITIES OF DAILY LIVING (ADL): ICD-10-CM

## 2020-12-18 DIAGNOSIS — S32.010D COMPRESSION FRACTURE OF L1 VERTEBRA WITH ROUTINE HEALING, SUBSEQUENT ENCOUNTER: Primary | ICD-10-CM

## 2020-12-18 PROCEDURE — 99307 SBSQ NF CARE SF MDM 10: CPT | Performed by: NURSE PRACTITIONER

## 2020-12-18 NOTE — PROGRESS NOTES
Herb Owusu, 2/21/1928, 80year old, female    Chief Complaint:  Patient presents with:   Follow - Up  Musculoskeletal Problem  Weakness     [facility] Admit date:  12/4/20  Discharge date to [facility]:  12/7/20  ELOS:  9-11 days  Anticipated discharge d affect appropriate, pleasant    Medications reviewed: Yes    Diagnostics reviewed:  None today    Assessment and plan:  Physical Deconditioning/Weakness; hx of falling  Fall Precautions  PT/OT/ST to evaluate and treat  [facility] team to establish care plan meeti

## 2020-12-22 ENCOUNTER — SNF DISCHARGE (OUTPATIENT)
Dept: INTERNAL MEDICINE CLINIC | Age: 85
End: 2020-12-22

## 2020-12-22 DIAGNOSIS — Z74.1 REQUIRES ASSISTANCE WITH ACTIVITIES OF DAILY LIVING (ADL): ICD-10-CM

## 2020-12-22 DIAGNOSIS — Z91.81 HX OF FALLING: ICD-10-CM

## 2020-12-22 DIAGNOSIS — S32.010D COMPRESSION FRACTURE OF L1 VERTEBRA WITH ROUTINE HEALING, SUBSEQUENT ENCOUNTER: Primary | ICD-10-CM

## 2020-12-22 DIAGNOSIS — Z79.899 MEDICATION MANAGEMENT: ICD-10-CM

## 2020-12-22 PROCEDURE — 99315 NF DSCHRG MGMT 30 MIN/LESS: CPT | Performed by: NURSE PRACTITIONER

## 2020-12-23 NOTE — PROGRESS NOTES
Michele Aquilinoevelina, 2/21/1928, 80year old, female is being discharged from Facility: [address] [place] [address]    Date of Admission: 12/7/20  Date of Discharge: 12/28/20                              Admitting Diagnoses:  L1 L2 compression fracture wit edema to BLEs  NEUROLOGIC: follows commands  PSYCHIATRIC: alert and oriented x 3; affect appropriate, pleasant    Skilled Nursing Facility Course:    · Marian Kaur has done well working with therapy; pain improved.   Dislikes wearing the back brace; can stop af

## 2020-12-28 ENCOUNTER — TELEPHONE (OUTPATIENT)
Dept: CARDIOLOGY | Age: 85
End: 2020-12-28

## 2020-12-29 ENCOUNTER — TELEPHONE (OUTPATIENT)
Dept: INTERNAL MEDICINE CLINIC | Facility: CLINIC | Age: 85
End: 2020-12-29

## 2020-12-29 ENCOUNTER — LAB REQUISITION (OUTPATIENT)
Dept: LAB | Age: 85
End: 2020-12-29
Payer: MEDICARE

## 2020-12-29 DIAGNOSIS — I25.10 ATHEROSCLEROTIC HEART DISEASE OF NATIVE CORONARY ARTERY WITHOUT ANGINA PECTORIS: ICD-10-CM

## 2020-12-29 DIAGNOSIS — S32.010D WEDGE COMPRESSION FRACTURE OF FIRST LUMBAR VERTEBRA, SUBSEQUENT ENCOUNTER FOR FRACTURE WITH ROUTINE HEALING: ICD-10-CM

## 2020-12-29 LAB
ANION GAP SERPL CALC-SCNC: 6 MMOL/L (ref 0–18)
BASOPHILS # BLD AUTO: 0.04 X10(3) UL (ref 0–0.2)
BASOPHILS NFR BLD AUTO: 0.4 %
BUN BLD-MCNC: 24 MG/DL (ref 7–18)
BUN/CREAT SERPL: 42.1 (ref 10–20)
CALCIUM BLD-MCNC: 9.5 MG/DL (ref 8.5–10.1)
CHLORIDE SERPL-SCNC: 108 MMOL/L (ref 98–112)
CO2 SERPL-SCNC: 25 MMOL/L (ref 21–32)
CREAT BLD-MCNC: 0.57 MG/DL
DEPRECATED RDW RBC AUTO: 47.3 FL (ref 35.1–46.3)
EOSINOPHIL # BLD AUTO: 0.09 X10(3) UL (ref 0–0.7)
EOSINOPHIL NFR BLD AUTO: 0.9 %
ERYTHROCYTE [DISTWIDTH] IN BLOOD BY AUTOMATED COUNT: 13.8 % (ref 11–15)
GLUCOSE BLD-MCNC: 90 MG/DL (ref 70–99)
HCT VFR BLD AUTO: 42.8 %
HGB BLD-MCNC: 13.8 G/DL
IMM GRANULOCYTES # BLD AUTO: 0.05 X10(3) UL (ref 0–1)
IMM GRANULOCYTES NFR BLD: 0.5 %
LYMPHOCYTES # BLD AUTO: 2.23 X10(3) UL (ref 1–4)
LYMPHOCYTES NFR BLD AUTO: 23 %
MCH RBC QN AUTO: 30.1 PG (ref 26–34)
MCHC RBC AUTO-ENTMCNC: 32.2 G/DL (ref 31–37)
MCV RBC AUTO: 93.4 FL
MONOCYTES # BLD AUTO: 0.74 X10(3) UL (ref 0.1–1)
MONOCYTES NFR BLD AUTO: 7.6 %
NEUTROPHILS # BLD AUTO: 6.54 X10 (3) UL (ref 1.5–7.7)
NEUTROPHILS # BLD AUTO: 6.54 X10(3) UL (ref 1.5–7.7)
NEUTROPHILS NFR BLD AUTO: 67.6 %
OSMOLALITY SERPL CALC.SUM OF ELEC: 292 MOSM/KG (ref 275–295)
PLATELET # BLD AUTO: 337 10(3)UL (ref 150–450)
POTASSIUM SERPL-SCNC: 4 MMOL/L (ref 3.5–5.1)
RBC # BLD AUTO: 4.58 X10(6)UL
SODIUM SERPL-SCNC: 139 MMOL/L (ref 136–145)
WBC # BLD AUTO: 9.7 X10(3) UL (ref 4–11)

## 2020-12-29 PROCEDURE — 85025 COMPLETE CBC W/AUTO DIFF WBC: CPT | Performed by: INTERNAL MEDICINE

## 2020-12-29 PROCEDURE — 80048 BASIC METABOLIC PNL TOTAL CA: CPT | Performed by: INTERNAL MEDICINE

## 2020-12-29 NOTE — TELEPHONE ENCOUNTER
Ermelinda Muro [facility], [phone]    Feliciano Anderson started today for Nursing, PT, OT, and [place] [facility] for showering    Pt had unwitnessed fall at home today in the morning, no injury

## 2020-12-29 NOTE — TELEPHONE ENCOUNTER
Spoke with Kirti Ojeda. Pt's son found on her on the carpeted bedroom floor this morning. Paramedics were called, they checked her out and she sustained no injuries. No c/o pain or bruising. Pt is up walking.   HH started today for nursing, PT/OT and home

## 2021-01-01 ENCOUNTER — EXTERNAL RECORD (OUTPATIENT)
Dept: HEALTH INFORMATION MANAGEMENT | Facility: OTHER | Age: 86
End: 2021-01-01

## 2021-01-15 ENCOUNTER — TELEPHONE (OUTPATIENT)
Dept: INTERNAL MEDICINE CLINIC | Facility: CLINIC | Age: 86
End: 2021-01-15

## 2021-01-15 NOTE — TELEPHONE ENCOUNTER
LM on VM for Dunia Abbott giving verbal okay for [facility] orders for OT as requested. Dunia Abbott to call back with any further questions.

## 2021-01-15 NOTE — TELEPHONE ENCOUNTER
Regina Romero calling from [facility] wants to have patient seen    1 x a week for 6 weeks effective today.      Please advise

## 2021-02-04 ENCOUNTER — TELEPHONE (OUTPATIENT)
Dept: INTERNAL MEDICINE CLINIC | Facility: CLINIC | Age: 86
End: 2021-02-04

## 2021-02-04 NOTE — TELEPHONE ENCOUNTER
Spoke with patient's dtr offered to schedule appt with RP tomorrow for evaluation, dtr declined stating paramedics were called to patient's home the day of fall and everything seemed to be fine. Again dtr declined appt and will call back if needed. Also, called Artis Cook OT to notify her of above. LM on VM. FYI to RP.

## 2021-02-04 NOTE — TELEPHONE ENCOUNTER
Spoke with patient's daughter. Stated that she does not want to schedule an appointment at the moment. Patient is not compliant with her appointments.

## 2021-02-04 NOTE — TELEPHONE ENCOUNTER
Emily Vazquez an occupational therapist would like to speak with a nurse regarding the patient falling yesterday. She stated that her vitals are normal she is having pain in her left side. The pain is 6 out of 10. She would like to speak with a nurse. Please advise.

## 2021-02-04 NOTE — TELEPHONE ENCOUNTER
I spoke with OT whom states that pt fell onto the ground 2 days ago. She is now experiencing L side pain from ribcage to hip. Pain in 6/10 and not being relieved by ibuprofen. Pt did not hit her head, has no visible bruising/swelling/redness or SOB. Pt can have a caregiver drive her to an appointment today after 4 or tomorrow from 8-12. Please advise if you can see this pt.

## 2021-02-09 ENCOUNTER — TELEPHONE (OUTPATIENT)
Dept: INTERNAL MEDICINE CLINIC | Facility: CLINIC | Age: 86
End: 2021-02-09

## 2021-02-09 DIAGNOSIS — I10 ESSENTIAL HYPERTENSION: Chronic | ICD-10-CM

## 2021-02-09 DIAGNOSIS — I25.10 CORONARY ARTERY DISEASE INVOLVING NATIVE CORONARY ARTERY OF NATIVE HEART WITHOUT ANGINA PECTORIS: Primary | Chronic | ICD-10-CM

## 2021-02-09 RX ORDER — METOPROLOL SUCCINATE 25 MG/1
12.5 TABLET, EXTENDED RELEASE ORAL
Qty: 45 TABLET | Refills: 1 | Status: SHIPPED | OUTPATIENT
Start: 2021-02-09 | End: 2021-08-28

## 2021-02-09 NOTE — TELEPHONE ENCOUNTER
Yes - she should still be on metoprolol succinate. Will refill at hospital dose - 12.5 mg every day.   New prescription sent to [place].

## 2021-02-09 NOTE — TELEPHONE ENCOUNTER
Wesly Coronel goes out to pt's home 1 x weekly. Pt has not been taking her Metoprolol. States she is not quite sure how long pt has been off it or if she ever took it upon discharge from the hospital.   B/p today 152/83 and at rest 138/74.

## 2021-02-09 NOTE — TELEPHONE ENCOUNTER
Hannah perera from [facility] is requesting to talk to the nurse regarding pt's medication, she would like to know why pt is no longer taking Metropolol and she is asking if pt can be put back on Metropolol? Please call and advise.      Metoprolol Succinate ER 25 MG Or

## 2021-02-16 ENCOUNTER — HOSPITAL ENCOUNTER (OUTPATIENT)
Facility: HOSPITAL | Age: 86
Setting detail: OBSERVATION
Discharge: HOME HEALTH | End: 2021-02-18
Attending: EMERGENCY MEDICINE | Admitting: HOSPITALIST
Payer: MEDICARE

## 2021-02-16 ENCOUNTER — HOSPITAL ENCOUNTER (OUTPATIENT)
Age: 86
Discharge: ANOTHER HOSPITAL | End: 2021-02-16
Payer: MEDICARE

## 2021-02-16 VITALS
RESPIRATION RATE: 18 BRPM | OXYGEN SATURATION: 96 % | TEMPERATURE: 98 F | DIASTOLIC BLOOD PRESSURE: 83 MMHG | HEART RATE: 77 BPM | SYSTOLIC BLOOD PRESSURE: 167 MMHG

## 2021-02-16 DIAGNOSIS — Z00.00 ENCOUNTER FOR ANNUAL HEALTH EXAMINATION: ICD-10-CM

## 2021-02-16 DIAGNOSIS — Z13.31 DEPRESSION SCREENING: ICD-10-CM

## 2021-02-16 DIAGNOSIS — R42 DIZZINESS: Primary | ICD-10-CM

## 2021-02-16 DIAGNOSIS — I16.0 HYPERTENSIVE URGENCY: ICD-10-CM

## 2021-02-16 DIAGNOSIS — G89.29 CHRONIC PAIN OF BOTH KNEES: ICD-10-CM

## 2021-02-16 DIAGNOSIS — R03.0 ELEVATED BLOOD PRESSURE READING: ICD-10-CM

## 2021-02-16 DIAGNOSIS — M25.561 CHRONIC PAIN OF BOTH KNEES: ICD-10-CM

## 2021-02-16 DIAGNOSIS — Z77.29 NATURAL GAS EXPOSURE: ICD-10-CM

## 2021-02-16 DIAGNOSIS — M25.562 CHRONIC PAIN OF BOTH KNEES: ICD-10-CM

## 2021-02-16 LAB
ALBUMIN SERPL-MCNC: 3.4 G/DL (ref 3.4–5)
ALBUMIN/GLOB SERPL: 1.1 {RATIO} (ref 1–2)
ALP LIVER SERPL-CCNC: 82 U/L
ALT SERPL-CCNC: 16 U/L
ANION GAP SERPL CALC-SCNC: 6 MMOL/L (ref 0–18)
AST SERPL-CCNC: 12 U/L (ref 15–37)
ATRIAL RATE: 79 BPM
BASOPHILS # BLD AUTO: 0.03 X10(3) UL (ref 0–0.2)
BASOPHILS NFR BLD AUTO: 0.3 %
BILIRUB SERPL-MCNC: 0.3 MG/DL (ref 0.1–2)
BILIRUB UR QL STRIP.AUTO: NEGATIVE
BUN BLD-MCNC: 17 MG/DL (ref 7–18)
BUN/CREAT SERPL: 23.9 (ref 10–20)
CALCIUM BLD-MCNC: 8.9 MG/DL (ref 8.5–10.1)
CARBOXYHEMOGLOBIN: 1.1 % SAT (ref 0–3)
CHLORIDE SERPL-SCNC: 106 MMOL/L (ref 98–112)
CLARITY UR REFRACT.AUTO: CLEAR
CO2 SERPL-SCNC: 27 MMOL/L (ref 21–32)
CREAT BLD-MCNC: 0.71 MG/DL
DEPRECATED RDW RBC AUTO: 46.2 FL (ref 35.1–46.3)
EOSINOPHIL # BLD AUTO: 0.1 X10(3) UL (ref 0–0.7)
EOSINOPHIL NFR BLD AUTO: 1.1 %
ERYTHROCYTE [DISTWIDTH] IN BLOOD BY AUTOMATED COUNT: 13.5 % (ref 11–15)
GLOBULIN PLAS-MCNC: 3.1 G/DL (ref 2.8–4.4)
GLUCOSE BLD-MCNC: 109 MG/DL (ref 70–99)
GLUCOSE UR STRIP.AUTO-MCNC: NEGATIVE MG/DL
HCT VFR BLD AUTO: 40.5 %
HGB BLD-MCNC: 13.3 G/DL
HGB O2 SATURATION: 94.9 % (ref 92–99)
IMM GRANULOCYTES # BLD AUTO: 0.03 X10(3) UL (ref 0–1)
IMM GRANULOCYTES NFR BLD: 0.3 %
KETONES UR STRIP.AUTO-MCNC: NEGATIVE MG/DL
LEUKOCYTE ESTERASE UR QL STRIP.AUTO: NEGATIVE
LYMPHOCYTES # BLD AUTO: 2.84 X10(3) UL (ref 1–4)
LYMPHOCYTES NFR BLD AUTO: 32.2 %
M PROTEIN MFR SERPL ELPH: 6.5 G/DL (ref 6.4–8.2)
MCH RBC QN AUTO: 30.2 PG (ref 26–34)
MCHC RBC AUTO-ENTMCNC: 32.8 G/DL (ref 31–37)
MCV RBC AUTO: 92 FL
METHEMOGLOBIN: 0.6 % SAT (ref 0.4–1.5)
MONOCYTES # BLD AUTO: 0.7 X10(3) UL (ref 0.1–1)
MONOCYTES NFR BLD AUTO: 7.9 %
NEUTROPHILS # BLD AUTO: 5.11 X10 (3) UL (ref 1.5–7.7)
NEUTROPHILS # BLD AUTO: 5.11 X10(3) UL (ref 1.5–7.7)
NEUTROPHILS NFR BLD AUTO: 58.2 %
NITRITE UR QL STRIP.AUTO: NEGATIVE
OSMOLALITY SERPL CALC.SUM OF ELEC: 290 MOSM/KG (ref 275–295)
P AXIS: 60 DEGREES
P-R INTERVAL: 166 MS
PH UR STRIP.AUTO: 8 [PH] (ref 4.5–8)
PLATELET # BLD AUTO: 280 10(3)UL (ref 150–450)
POTASSIUM SERPL-SCNC: 3.8 MMOL/L (ref 3.5–5.1)
PROT UR STRIP.AUTO-MCNC: NEGATIVE MG/DL
Q-T INTERVAL: 382 MS
QRS DURATION: 88 MS
QTC CALCULATION (BEZET): 438 MS
R AXIS: 23 DEGREES
RBC # BLD AUTO: 4.4 X10(6)UL
RBC UR QL AUTO: NEGATIVE
SARS-COV-2 RNA RESP QL NAA+PROBE: NOT DETECTED
SODIUM SERPL-SCNC: 139 MMOL/L (ref 136–145)
SP GR UR STRIP.AUTO: 1 (ref 1–1.03)
T AXIS: 67 DEGREES
TOTAL HEMOGLOBIN: 12.6 G/DL
TROPONIN I SERPL-MCNC: <0.045 NG/ML (ref ?–0.04)
UROBILINOGEN UR STRIP.AUTO-MCNC: <2 MG/DL
VENTRICULAR RATE: 79 BPM
WBC # BLD AUTO: 8.8 X10(3) UL (ref 4–11)

## 2021-02-16 PROCEDURE — 93010 ELECTROCARDIOGRAM REPORT: CPT

## 2021-02-16 PROCEDURE — 93005 ELECTROCARDIOGRAM TRACING: CPT

## 2021-02-16 PROCEDURE — 99214 OFFICE O/P EST MOD 30 MIN: CPT

## 2021-02-16 PROCEDURE — 99219 INITIAL OBSERVATION CARE,LEVL II: CPT | Performed by: HOSPITALIST

## 2021-02-16 RX ORDER — ACETAMINOPHEN 325 MG/1
325 TABLET ORAL EVERY 6 HOURS PRN
COMMUNITY

## 2021-02-16 RX ORDER — BISACODYL 10 MG
10 SUPPOSITORY, RECTAL RECTAL
Status: DISCONTINUED | OUTPATIENT
Start: 2021-02-16 | End: 2021-02-18

## 2021-02-16 RX ORDER — POLYETHYLENE GLYCOL 3350 17 G/17G
17 POWDER, FOR SOLUTION ORAL DAILY PRN
Status: DISCONTINUED | OUTPATIENT
Start: 2021-02-16 | End: 2021-02-18

## 2021-02-16 RX ORDER — ACETAMINOPHEN 325 MG/1
650 TABLET ORAL EVERY 6 HOURS PRN
Status: DISCONTINUED | OUTPATIENT
Start: 2021-02-16 | End: 2021-02-18

## 2021-02-16 RX ORDER — ENALAPRILAT 2.5 MG/2ML
1.25 INJECTION INTRAVENOUS ONCE
Status: COMPLETED | OUTPATIENT
Start: 2021-02-16 | End: 2021-02-16

## 2021-02-16 RX ORDER — LOSARTAN POTASSIUM 50 MG/1
50 TABLET ORAL ONCE
Status: COMPLETED | OUTPATIENT
Start: 2021-02-16 | End: 2021-02-16

## 2021-02-16 RX ORDER — ENOXAPARIN SODIUM 100 MG/ML
40 INJECTION SUBCUTANEOUS DAILY
Status: DISCONTINUED | OUTPATIENT
Start: 2021-02-16 | End: 2021-02-18

## 2021-02-16 RX ORDER — ONDANSETRON 2 MG/ML
8 INJECTION INTRAMUSCULAR; INTRAVENOUS EVERY 6 HOURS PRN
Status: DISCONTINUED | OUTPATIENT
Start: 2021-02-16 | End: 2021-02-18

## 2021-02-16 RX ORDER — MELATONIN
3 NIGHTLY PRN
Status: DISCONTINUED | OUTPATIENT
Start: 2021-02-16 | End: 2021-02-18

## 2021-02-16 RX ORDER — HYDRALAZINE HYDROCHLORIDE 20 MG/ML
5 INJECTION INTRAMUSCULAR; INTRAVENOUS EVERY 4 HOURS PRN
Status: DISCONTINUED | OUTPATIENT
Start: 2021-02-16 | End: 2021-02-18

## 2021-02-16 RX ORDER — LABETALOL HYDROCHLORIDE 5 MG/ML
10 INJECTION, SOLUTION INTRAVENOUS ONCE
Status: COMPLETED | OUTPATIENT
Start: 2021-02-16 | End: 2021-02-16

## 2021-02-16 NOTE — ED INITIAL ASSESSMENT (HPI)
Pt here after caregiver brought her in. Caregiver states when she walked up to house she could smell gas from the outside. Pt had left burner on from unknown time but from talking w/ pt, possibly from last night. Pt c/o dizziness earlier, denies now.  Jorge Alberto Alejandro

## 2021-02-16 NOTE — ED INITIAL ASSESSMENT (HPI)
Per pts daughter pt left stove burner on overnight. Pts daughter states pt c/o dizziness and increased sleepiness throughout the day.  Pt sent from [address] [place] [address] for further eval.

## 2021-02-16 NOTE — ED PROVIDER NOTES
Patient Seen in: [facility] Emergency Department      History   Patient presents with:  Dizziness  Other    Stated Complaint: Left the oven burner on overnight; having dizziness today and increased sleepin*    HPI/Subjective:   HPI    This is a plea overnight; having dizziness today and increased sleepin*  Other systems are as noted in HPI. Constitutional and vital signs reviewed. All other systems reviewed and negative except as noted above.     Physical Exam     ED Triage Vitals [02/16/21 1513] CBC W/ DIFFERENTIAL[[id number]]                              Final result                 Please view results for these tests on the individual orders.    URINALYSIS WITH CULTURE REFLEX   COOXIMETRY   RAINBOW DRAW BLUE   RAINBOW DRAW LAVENDER   RA

## 2021-02-16 NOTE — ED PROVIDER NOTES
Patient Seen in: [facility]      History   Patient presents with:  Dizziness  Fatigue    Stated Complaint: DIZZINESS.  GAS LEFT ON FROM STOVE INSIDE HOUSE FOR UNKNOWN PERIOD OF TIME     HPI/Subjective:   HPI    31-year-old female here via noncontributory to the presenting problem, except as indicated as above. Review of Systems    Positive for stated complaint: DIZZINESS. GAS LEFT ON FROM STOVE INSIDE HOUSE FOR UNKNOWN PERIOD OF TIME   Other systems are as noted in HPI.   Constitutional a Otherwise Normal EKG            NOTE: Notified  her son José Manuel Gibbons [phone] who is power of  that they are going to the emergency room for further evaluation and treatment. Patient is stable and in no acute distress.   Patient will be driven by her

## 2021-02-17 ENCOUNTER — TELEPHONE (OUTPATIENT)
Dept: CARDIOLOGY | Age: 86
End: 2021-02-17

## 2021-02-17 LAB
ANION GAP SERPL CALC-SCNC: 6 MMOL/L (ref 0–18)
BASOPHILS # BLD AUTO: 0.03 X10(3) UL (ref 0–0.2)
BASOPHILS NFR BLD AUTO: 0.5 %
BUN BLD-MCNC: 15 MG/DL (ref 7–18)
BUN/CREAT SERPL: 22.1 (ref 10–20)
CALCIUM BLD-MCNC: 8.5 MG/DL (ref 8.5–10.1)
CHLORIDE SERPL-SCNC: 106 MMOL/L (ref 98–112)
CO2 SERPL-SCNC: 27 MMOL/L (ref 21–32)
CREAT BLD-MCNC: 0.68 MG/DL
DEPRECATED RDW RBC AUTO: 46 FL (ref 35.1–46.3)
EOSINOPHIL # BLD AUTO: 0.12 X10(3) UL (ref 0–0.7)
EOSINOPHIL NFR BLD AUTO: 2 %
ERYTHROCYTE [DISTWIDTH] IN BLOOD BY AUTOMATED COUNT: 13.6 % (ref 11–15)
GLUCOSE BLD-MCNC: 97 MG/DL (ref 70–99)
HAV IGM SER QL: 2.2 MG/DL (ref 1.6–2.6)
HCT VFR BLD AUTO: 35.3 %
HGB BLD-MCNC: 11.5 G/DL
IMM GRANULOCYTES # BLD AUTO: 0.01 X10(3) UL (ref 0–1)
IMM GRANULOCYTES NFR BLD: 0.2 %
LYMPHOCYTES # BLD AUTO: 2.65 X10(3) UL (ref 1–4)
LYMPHOCYTES NFR BLD AUTO: 43.1 %
MCH RBC QN AUTO: 30.1 PG (ref 26–34)
MCHC RBC AUTO-ENTMCNC: 32.6 G/DL (ref 31–37)
MCV RBC AUTO: 92.4 FL
MONOCYTES # BLD AUTO: 0.51 X10(3) UL (ref 0.1–1)
MONOCYTES NFR BLD AUTO: 8.3 %
NEUTROPHILS # BLD AUTO: 2.83 X10 (3) UL (ref 1.5–7.7)
NEUTROPHILS # BLD AUTO: 2.83 X10(3) UL (ref 1.5–7.7)
NEUTROPHILS NFR BLD AUTO: 45.9 %
OSMOLALITY SERPL CALC.SUM OF ELEC: 289 MOSM/KG (ref 275–295)
PLATELET # BLD AUTO: 238 10(3)UL (ref 150–450)
POTASSIUM SERPL-SCNC: 3.6 MMOL/L (ref 3.5–5.1)
POTASSIUM SERPL-SCNC: 3.8 MMOL/L (ref 3.5–5.1)
RBC # BLD AUTO: 3.82 X10(6)UL
SODIUM SERPL-SCNC: 139 MMOL/L (ref 136–145)
WBC # BLD AUTO: 6.2 X10(3) UL (ref 4–11)

## 2021-02-17 PROCEDURE — 99225 SUBSEQUENT OBSERVATION CARE: CPT | Performed by: HOSPITALIST

## 2021-02-17 RX ORDER — LOSARTAN POTASSIUM 25 MG/1
25 TABLET ORAL EVERY MORNING
Status: DISCONTINUED | OUTPATIENT
Start: 2021-02-17 | End: 2021-02-18

## 2021-02-17 RX ORDER — LOSARTAN POTASSIUM 25 MG/1
25 TABLET ORAL ONCE
Status: COMPLETED | OUTPATIENT
Start: 2021-02-17 | End: 2021-02-17

## 2021-02-17 RX ORDER — LOSARTAN POTASSIUM 50 MG/1
50 TABLET ORAL NIGHTLY
Status: DISCONTINUED | OUTPATIENT
Start: 2021-02-17 | End: 2021-02-18

## 2021-02-17 RX ORDER — POTASSIUM CHLORIDE 1.5 G/1.77G
40 POWDER, FOR SOLUTION ORAL EVERY 4 HOURS
Status: COMPLETED | OUTPATIENT
Start: 2021-02-17 | End: 2021-02-17

## 2021-02-17 RX ORDER — AMLODIPINE BESYLATE 5 MG/1
5 TABLET ORAL DAILY
Status: DISCONTINUED | OUTPATIENT
Start: 2021-02-17 | End: 2021-02-18

## 2021-02-17 RX ORDER — LOSARTAN POTASSIUM 25 MG/1
50 TABLET ORAL 2 TIMES DAILY
Qty: 60 TABLET | Refills: 0 | Status: SHIPPED | OUTPATIENT
Start: 2021-02-17 | End: 2021-03-01

## 2021-02-17 RX ORDER — AMLODIPINE BESYLATE 5 MG/1
5 TABLET ORAL DAILY
Qty: 30 TABLET | Refills: 0 | Status: SHIPPED | OUTPATIENT
Start: 2021-02-17 | End: 2021-03-01

## 2021-02-17 NOTE — PHYSICAL THERAPY NOTE
PHYSICAL THERAPY QUICK EVALUATION - INPATIENT    Room Number: 2718/8160-L  Evaluation Date: 2/17/2021  Presenting Problem: Syncope and collapse due to dizziness  Physician Order: PT Eval and Treat    History:  Patient is a 80year old female admitted on ago)  Patient Owned Equipment: Rolling walker;Cane  Patient Regularly Uses: Reading glasses    Prior Level of [place]: Part time caregiver during hours pt is awake, however pt lives alone without caregiver throughout the night.  Pt reported modified in (ft): 125  Assistive Device: Rolling walker  Pattern: Comment(increased lateral sway)  Stoop/Curb Assistance: Not tested       Skilled Therapy Provided: Pt received sitting EOB with PCT present assessing BP.  Son called with questions regarding LOS therefor evolving and overall evaluation complexity is considered moderate. PT Discharge Recommendations: 24 hour care/supervision     PLAN  Patient has been evaluated and presents with no skilled Physical Therapy needs at this time.   Patient discharged from [facility]

## 2021-02-17 NOTE — CONSULTS
[facility] Cardiology  Report of Consultation    Viviane Chun Patient Status:  Observation    1928 MRN [id number]   [facility] 3NE-A Attending Neto Solano MD   Hosp Day # 0 PCP Girish Gillis MD     Reason for Consultation:  Geri Greenfield current alcohol use of about 2.0 standard drinks of alcohol per week. She reports that she does not use drugs.     Allergies:    Atorvastatin            MYALGIA  Sulfa Antibiotics       RASH    Comment:Other reaction(s): SULFA (SULFONAMIDE ANTIBIOTICS)    M Abdomen: Soft, non-tender. Extremities: Without clubbing, cyanosis or edema  Neurologic: Alert and oriented, normal affect. Skin: Warm and dry.      Laboratories and Data:  Diagnostics:    Labs:   Lab Results   Component Value Date    HGB 11.5 02/17/20

## 2021-02-17 NOTE — H&P
[facility]  History and Physical     Brigida Danger Patient Status:  Emergency    1928 MRN [id number]   Location [address] Attending Jamey Oh MD   Hosp Day # 0 PCP Nohemi Diez MD     Chief Complaint encounter. •  Metoprolol Succinate ER 25 MG Oral Tablet 24 Hr, Take 0.5 tablets (12.5 mg total) by mouth Daily Beta Blocker. , Disp: 45 tablet, Rfl: 1    •  omeprazole 20 MG Oral Capsule Delayed Release, Take 20 mg by mouth every morning., Disp: , Rfl: HGB 13.3   MCV 92.0   .0       Recent Labs   Lab 02/16/21  1523   *   BUN 17   CREATSERUM 0.71   GFRAA 86   GFRNAA 74   CA 8.9   ALB 3.4      K 3.8      CO2 27.0   ALKPHO 82   AST 12*   ALT 16   BILT 0.3   TP 6.5       Estimated

## 2021-02-17 NOTE — HISTORICAL OFFICE NOTE
Progress Notes  - documented in this encounter  Junito Morin MD - 03/10/2020 11:45 AM CDT  Formatting of this note might be different from the original.  3/10/2020    [address] Note Medication Sig Dispense Refill   • losartan (COZAAR) 50 MG tablet Take 50 mg by mouth daily. Take 25 mg in the am and 50 mg in the pm daily   • losartan (COZAAR) 25 MG tablet Take 1 tablet by mouth daily.  ONE 25 MG TABLET IN A.M. ONE 50 MG TABLET IN THE her again in 1 year.     She should have some basic blood work performed and I will order that for her as I am her only physician    Abner Cagle MD  03/10/20      Electronically signed by Abner Cagle MD at 03/10/2020 12:21 PM CDT

## 2021-02-17 NOTE — CM/SW NOTE
CAM received a call from Shawn Carrington at [facility] requesting clinical as the patient will be moving into their jail. MSW called patient's son Whitley Sparks who confirmed and provided permission for CAM to forward the medical record. Faxed to [address] at [phone].     Gibson Escobart

## 2021-02-17 NOTE — PLAN OF CARE
Pt. A&O x3, on RA, tele shows NSR. VSS throughout the shift, no more episodes of elevated BP. Lovenox for prophylaxis. Up x1/walker. Saline locked. No c/o pain or dizziness. Fall and safety precautions in place. Will continue to monitor.        Problem: Cortez Gaston

## 2021-02-17 NOTE — PLAN OF CARE
Assumed care at [facility], A/Ox3. R/A, NSR on tele. Stubborn HTN. Cards consulted. Take BP's manually. PRN hydralizine given w/.tempory/minimal effect. Reg diet. Up w/ 1assist/standby. Voids but can be incontinent of urine at times. Briefed w/pull up.  Cristian Grief

## 2021-02-17 NOTE — ED NOTES
Dinner tray ordered for PT. Caretaker Carson Orozco updated son Brenda Lin on admission. Carson Orozco is leaving for the night.

## 2021-02-17 NOTE — PROGRESS NOTES
[facility]  Progress Note     Toñito Wilkins Patient Status:  Observation    1928 MRN [id number]   [facility] 3NE-A Attending Evangelina Erickson MD   Hosp Day # 0 PCP Marisol Clark MD     Chief Complaint: unable to care for dianna • Losartan Potassium  50 mg Oral Nightly   • Metoprolol Succinate ER  12.5 mg Oral Daily Beta Blocker   • potassium chloride  40 mEq Oral Q4H   • enoxaparin  40 mg Subcutaneous Daily       ASSESSMENT / PLAN:     1.  Unable to care for self- needs 24 hours

## 2021-02-17 NOTE — HOME CARE LIAISON
Ptnt current with [facility] for sn/pt  Will need HARDEEP order on or before dc    Thanks  Fabiola Bradley

## 2021-02-17 NOTE — CM/SW NOTE
02/17/21 1000   CM/SW Referral Data   Referral Source Physician   Reason for Referral Discharge planning   Informant Patient; Children   Social History   Recreational Drug/Alcohol Use no   Major Changes Last 6 Months no   Domestic/Partner Violence no   S

## 2021-02-18 VITALS
TEMPERATURE: 98 F | RESPIRATION RATE: 18 BRPM | OXYGEN SATURATION: 96 % | DIASTOLIC BLOOD PRESSURE: 68 MMHG | SYSTOLIC BLOOD PRESSURE: 168 MMHG | HEART RATE: 64 BPM | HEIGHT: 62 IN | BODY MASS INDEX: 22.97 KG/M2 | WEIGHT: 124.81 LBS

## 2021-02-18 PROCEDURE — 99217 OBSERVATION CARE DISCHARGE: CPT | Performed by: HOSPITALIST

## 2021-02-18 RX ORDER — LOSARTAN POTASSIUM 100 MG/1
100 TABLET ORAL EVERY MORNING
Status: DISCONTINUED | OUTPATIENT
Start: 2021-02-19 | End: 2021-02-18

## 2021-02-18 NOTE — DISCHARGE SUMMARY
[facility]  DISCHARGE SUMMARY     Aditya Dinero Patient Status:  Observation    1928 MRN [id number]   [facility] 3NE-A Attending Burgess Jermaine MD   Hosp Day # 0 PCP Severa Boop, MD     Date of Admission: 2021  Date Prescription details   Losartan Potassium 25 MG Tabs  Commonly known as: COZAAR  What changed:   · how much to take  · when to take this  · Another medication with the same name was removed.  Continue taking this medication, and follow the directions you se Peter Rolle MD    Time spent:  > 30 minutes

## 2021-02-18 NOTE — PROGRESS NOTES
[facility]  Cardiology Progress Note    Subjective:  No chest pain or shortness of breath. Very sweet and forgetful elderly lady. Has no complaints. Was able to tell me her name but didn't know where she was or what the year was.      Objective:  BP 15

## 2021-02-18 NOTE — PLAN OF CARE
NURSING DISCHARGE NOTE    Discharged Home via Wheelchair. Accompanied by Support staff  Belongings Taken by patient/family. Discharge papers given and explained. Patient took all belongings.

## 2021-02-18 NOTE — PLAN OF CARE
Pt. A&O x3, on RA, tele shows NSR. VSS. No c/o pain. Up with assist/walker. Saline locked. Fall and safety precautions in place. Will continue to monitor.      Problem: Patient/Family Goals  Goal: Patient/Family Long Term Goal  Description: Patient's Mario Nash

## 2021-02-19 ENCOUNTER — PATIENT OUTREACH (OUTPATIENT)
Dept: CASE MANAGEMENT | Age: 86
End: 2021-02-19

## 2021-02-19 DIAGNOSIS — Z02.9 ENCOUNTERS FOR ADMINISTRATIVE PURPOSE: ICD-10-CM

## 2021-02-19 PROCEDURE — 1111F DSCHRG MED/CURRENT MED MERGE: CPT

## 2021-02-19 NOTE — PROGRESS NOTES
Initial Post Discharge Follow Up   Discharge Date: 2/18/21  Contact Date: 2/19/2021    Consent Verification:  Assessment Completed With: Patient  HIPAA Verified?   Yes    Discharge Dx:  Dizziness    Was TCC ordered: no       General:   • How have you bee hospital? Yes  • May I go over your medications with you to make sure we are not missing anything? yes  • Are there any reasons that keep you from taking your medication as prescribed? No  Are you having any concerns with constipation?  No    Referrals/order

## 2021-02-20 ENCOUNTER — TELEPHONE (OUTPATIENT)
Dept: INTERNAL MEDICINE CLINIC | Facility: CLINIC | Age: 86
End: 2021-02-20

## 2021-02-20 NOTE — TELEPHONE ENCOUNTER
Lamar Aschoff from residential home health is calling to let Dr. Grady Ravi know that she is being readmitted to home health services

## 2021-03-01 ENCOUNTER — OFFICE VISIT (OUTPATIENT)
Dept: INTERNAL MEDICINE CLINIC | Facility: CLINIC | Age: 86
End: 2021-03-01
Payer: MEDICARE

## 2021-03-01 ENCOUNTER — LAB ENCOUNTER (OUTPATIENT)
Dept: LAB | Age: 86
End: 2021-03-01
Attending: INTERNAL MEDICINE
Payer: MEDICARE

## 2021-03-01 VITALS
TEMPERATURE: 98 F | HEART RATE: 79 BPM | RESPIRATION RATE: 12 BRPM | HEIGHT: 62 IN | BODY MASS INDEX: 22.3 KG/M2 | SYSTOLIC BLOOD PRESSURE: 138 MMHG | DIASTOLIC BLOOD PRESSURE: 70 MMHG | WEIGHT: 121.19 LBS | OXYGEN SATURATION: 98 %

## 2021-03-01 DIAGNOSIS — E55.9 VITAMIN D DEFICIENCY: ICD-10-CM

## 2021-03-01 DIAGNOSIS — R53.83 FATIGUE, UNSPECIFIED TYPE: ICD-10-CM

## 2021-03-01 DIAGNOSIS — R41.3 MEMORY CHANGES: ICD-10-CM

## 2021-03-01 DIAGNOSIS — I10 ESSENTIAL HYPERTENSION: Primary | Chronic | ICD-10-CM

## 2021-03-01 PROBLEM — S32.010A CLOSED WEDGE COMPRESSION FRACTURE OF L1 VERTEBRA, INITIAL ENCOUNTER (HCC): Status: RESOLVED | Noted: 2020-12-04 | Resolved: 2021-03-01

## 2021-03-01 PROBLEM — M50.30 DDD (DEGENERATIVE DISC DISEASE), CERVICAL: Chronic | Status: ACTIVE | Noted: 2019-10-08

## 2021-03-01 PROBLEM — R55 SYNCOPE AND COLLAPSE: Status: RESOLVED | Noted: 2020-12-04 | Resolved: 2021-03-01

## 2021-03-01 LAB
TSI SER-ACNC: 1.31 MIU/ML (ref 0.36–3.74)
VIT B12 SERPL-MCNC: 774 PG/ML (ref 193–986)
VIT D+METAB SERPL-MCNC: 13 NG/ML (ref 30–100)

## 2021-03-01 PROCEDURE — 84443 ASSAY THYROID STIM HORMONE: CPT

## 2021-03-01 PROCEDURE — 82306 VITAMIN D 25 HYDROXY: CPT

## 2021-03-01 PROCEDURE — 36415 COLL VENOUS BLD VENIPUNCTURE: CPT

## 2021-03-01 PROCEDURE — 82607 VITAMIN B-12: CPT

## 2021-03-01 PROCEDURE — 99495 TRANSJ CARE MGMT MOD F2F 14D: CPT | Performed by: INTERNAL MEDICINE

## 2021-03-01 RX ORDER — LOSARTAN POTASSIUM 50 MG/1
50 TABLET ORAL 2 TIMES DAILY
Qty: 180 TABLET | Refills: 1 | Status: SHIPPED | OUTPATIENT
Start: 2021-03-01

## 2021-03-01 RX ORDER — AMLODIPINE BESYLATE 5 MG/1
5 TABLET ORAL DAILY
Qty: 90 TABLET | Refills: 1 | Status: SHIPPED | OUTPATIENT
Start: 2021-03-01

## 2021-03-01 NOTE — PATIENT INSTRUCTIONS
- Blood pressure looks fine. We will continue current medications for now  - We will check a few more blood tests to look into your tiredness and memory issues. - Follow up with our neurologist (Dr. Jorge Parekh) for your memory issues.   - Follow up with me

## 2021-03-01 NOTE — PROGRESS NOTES
HPI:    Toñito Wilkins is a 80year old female here today for hospital follow up. She was discharged from Inpatient hospital, [facility] to Home   Admission Date: 2/16/21   Discharge Date: 2/18/21  Hospital Discharge Diagnoses (since 1/30/2021):  Un adjusted. She was discharged home. Some of the notes mention some trouble recalling things/memory issues. Patient denies any major issues today.   Her caregiver is here with her - she notes that patient has occasional memory issues, but not on a daily ba does not use drugs.      ROS:   GENERAL: generalized fatigue  SKIN: denies any unusual skin lesions  EYES: denies blurred vision or double vision  HEENT: denies nasal congestion, sinus pain or ST  LUNGS: denies shortness of breath with exertion  CARDIOVASCU amLODIPine Besylate 5 MG Oral Tab; Take 1 tablet (5 mg total) by mouth daily. Dispense: 90 tablet; Refill: 1  - losartan Potassium 50 MG Oral Tab; Take 1 tablet (50 mg total) by mouth 2 (two) times daily. Dispense: 180 tablet; Refill: 1    2.  Fatigue, un Significant Complications, Morbidity, and/or Mortality: moderate    Overall Risk:   moderate    Patient seen within 14 days from date of discharge.      Kenny Jean MD, 3/1/2021

## 2021-03-02 DIAGNOSIS — E55.9 VITAMIN D DEFICIENCY: Primary | ICD-10-CM

## 2021-03-02 RX ORDER — ERGOCALCIFEROL 1.25 MG/1
50000 CAPSULE ORAL WEEKLY
Qty: 12 CAPSULE | Refills: 1 | Status: SHIPPED | OUTPATIENT
Start: 2021-03-02

## 2021-03-03 ENCOUNTER — TELEPHONE (OUTPATIENT)
Dept: INTERNAL MEDICINE CLINIC | Facility: CLINIC | Age: 86
End: 2021-03-03

## 2021-03-03 NOTE — TELEPHONE ENCOUNTER
Received form to be complete for pt from [facility] - placed in your inbasket.

## 2021-03-04 NOTE — TELEPHONE ENCOUNTER
Faxed form and received confirmation - copy placed in blue folder. Later, Lana Pascal walked in to office from [employer]. She states they will do TB test if needed and if not they will inform our office.

## 2021-03-04 NOTE — TELEPHONE ENCOUNTER
Form completed, in my out box - not sure if she needs TB testing ordered by us or if Angelia Olivera can do it themselves.

## 2021-03-09 DIAGNOSIS — Z23 NEED FOR VACCINATION: ICD-10-CM

## 2021-03-23 ENCOUNTER — APPOINTMENT (OUTPATIENT)
Dept: CARDIOLOGY | Age: 86
End: 2021-03-23

## 2021-05-03 ENCOUNTER — DOCUMENTATION ONLY (OUTPATIENT)
Dept: INTERNAL MEDICINE CLINIC | Facility: CLINIC | Age: 86
End: 2021-05-03

## 2021-05-03 NOTE — PROGRESS NOTES
[facility] - Discharge Tranfer Summary Report from 5/1/21 12:23:52 AM received and placed in PCP in basket.

## 2021-05-10 ENCOUNTER — TELEPHONE (OUTPATIENT)
Dept: INTERNAL MEDICINE CLINIC | Facility: CLINIC | Age: 86
End: 2021-05-10

## 2021-08-04 ENCOUNTER — TELEPHONE (OUTPATIENT)
Dept: CARDIOLOGY | Age: 86
End: 2021-08-04

## 2021-08-21 ENCOUNTER — TELEPHONE (OUTPATIENT)
Dept: INTERNAL MEDICINE CLINIC | Facility: CLINIC | Age: 86
End: 2021-08-21

## 2021-08-21 DIAGNOSIS — I25.10 CORONARY ARTERY DISEASE INVOLVING NATIVE CORONARY ARTERY OF NATIVE HEART WITHOUT ANGINA PECTORIS: Chronic | ICD-10-CM

## 2021-08-21 DIAGNOSIS — I10 ESSENTIAL HYPERTENSION: Chronic | ICD-10-CM

## 2021-08-25 NOTE — TELEPHONE ENCOUNTER
Attempted to contact pt to clarify sig -     Passed protocol     Last refill:  2/9/2021 Metoprolol ER 25 mg #45 1R    LOV:   3/1/2021 Dr Chávez Prey RTC 6 months  1.  Essential hypertension  Repeat reading at goal.  Blood pressure was elevated in emergency depa

## 2021-08-26 ENCOUNTER — TELEPHONE (OUTPATIENT)
Dept: CARDIOLOGY | Age: 86
End: 2021-08-26

## 2021-08-28 NOTE — TELEPHONE ENCOUNTER
Attempted to contact pt - no answer x 2    Please review sig, should pt be on 1/2 tab daily or 1 tab daily of Metoprolol 25 mg. Pharmacy requesting as 1/2 tab, LOV states 1 tab.

## 2021-08-29 RX ORDER — METOPROLOL SUCCINATE 25 MG/1
12.5 TABLET, EXTENDED RELEASE ORAL DAILY
Qty: 45 TABLET | Refills: 0 | Status: SHIPPED | OUTPATIENT
Start: 2021-08-29

## 2021-08-30 NOTE — TELEPHONE ENCOUNTER
Pt son Don(on hippa) returning call from [place]. Brandon Barajas stated that he is not sure why pt would need refill request. Pt is currently living at [facility], assisted living and they have their own doctors and they dispense medication there.      Confirmed [phone] 1.69

## 2021-09-23 ENCOUNTER — APPOINTMENT (OUTPATIENT)
Dept: CT IMAGING | Age: 86
End: 2021-09-23
Attending: EMERGENCY MEDICINE

## 2021-09-23 ENCOUNTER — HOSPITAL ENCOUNTER (EMERGENCY)
Age: 86
Discharge: HOME | End: 2021-09-24
Attending: EMERGENCY MEDICINE

## 2021-09-23 DIAGNOSIS — S09.90XA CLOSED HEAD INJURY, INITIAL ENCOUNTER: Primary | ICD-10-CM

## 2021-09-23 PROCEDURE — G1004 CDSM NDSC: HCPCS

## 2021-09-23 PROCEDURE — 99283 EMERGENCY DEPT VISIT LOW MDM: CPT

## 2021-09-23 PROCEDURE — 72125 CT NECK SPINE W/O DYE: CPT

## 2021-09-23 PROCEDURE — 70450 CT HEAD/BRAIN W/O DYE: CPT

## 2021-09-24 VITALS
SYSTOLIC BLOOD PRESSURE: 165 MMHG | OXYGEN SATURATION: 98 % | BODY MASS INDEX: 25.4 KG/M2 | WEIGHT: 138.89 LBS | RESPIRATION RATE: 16 BRPM | DIASTOLIC BLOOD PRESSURE: 81 MMHG | TEMPERATURE: 97.9 F | HEART RATE: 70 BPM

## 2021-09-30 ASSESSMENT — ENCOUNTER SYMPTOMS
SORE THROAT: 0
APPETITE CHANGE: 0
LIGHT-HEADEDNESS: 0
DIZZINESS: 0
ACTIVITY CHANGE: 0
BLOOD IN STOOL: 0
VOMITING: 0
WHEEZING: 0
DIARRHEA: 0
ADENOPATHY: 0
NAUSEA: 0
FEVER: 0
COUGH: 0
HEADACHES: 0
STRIDOR: 0
SEIZURES: 0
CHEST TIGHTNESS: 0
RHINORRHEA: 0
NERVOUS/ANXIOUS: 0
CONFUSION: 0
ABDOMINAL PAIN: 0
CHILLS: 0
CONSTIPATION: 0
BACK PAIN: 0
EYE DISCHARGE: 0
ABDOMINAL DISTENTION: 0
POLYDIPSIA: 0
SHORTNESS OF BREATH: 0
WEAKNESS: 0
FATIGUE: 0

## 2022-01-01 ENCOUNTER — TELEPHONE (OUTPATIENT)
Dept: CASE MANAGEMENT | Facility: HOSPITAL | Age: 87
End: 2022-01-01

## 2022-04-06 ENCOUNTER — HOSPITAL ENCOUNTER (EMERGENCY)
Facility: HOSPITAL | Age: 87
Discharge: NURSING, REHAB OR LONG-TERM CARE | End: 2022-04-07
Attending: EMERGENCY MEDICINE
Payer: MEDICARE

## 2022-04-06 ENCOUNTER — APPOINTMENT (OUTPATIENT)
Dept: CT IMAGING | Facility: HOSPITAL | Age: 87
End: 2022-04-06
Attending: EMERGENCY MEDICINE
Payer: MEDICARE

## 2022-04-06 ENCOUNTER — APPOINTMENT (OUTPATIENT)
Dept: GENERAL RADIOLOGY | Facility: HOSPITAL | Age: 87
End: 2022-04-06
Attending: EMERGENCY MEDICINE
Payer: MEDICARE
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
## 2022-04-06 DIAGNOSIS — R07.9 CHEST PAIN OF UNCERTAIN ETIOLOGY: Primary | ICD-10-CM

## 2022-04-06 LAB
ALBUMIN SERPL-MCNC: 3.3 G/DL (ref 3.4–5)
ALBUMIN/GLOB SERPL: 1.1 {RATIO} (ref 1–2)
ALP LIVER SERPL-CCNC: 66 U/L
ALT SERPL-CCNC: 23 U/L
ANION GAP SERPL CALC-SCNC: 5 MMOL/L (ref 0–18)
AST SERPL-CCNC: 20 U/L (ref 15–37)
BASOPHILS # BLD AUTO: 0.06 X10(3) UL (ref 0–0.2)
BASOPHILS NFR BLD AUTO: 0.6 %
BILIRUB SERPL-MCNC: 0.3 MG/DL (ref 0.1–2)
BUN BLD-MCNC: 23 MG/DL (ref 7–18)
CALCIUM BLD-MCNC: 9.3 MG/DL (ref 8.5–10.1)
CHLORIDE SERPL-SCNC: 104 MMOL/L (ref 98–112)
CO2 SERPL-SCNC: 29 MMOL/L (ref 21–32)
CREAT BLD-MCNC: 1.12 MG/DL
EOSINOPHIL # BLD AUTO: 0.14 X10(3) UL (ref 0–0.7)
EOSINOPHIL NFR BLD AUTO: 1.4 %
ERYTHROCYTE [DISTWIDTH] IN BLOOD BY AUTOMATED COUNT: 13.5 %
GLOBULIN PLAS-MCNC: 2.9 G/DL (ref 2.8–4.4)
GLUCOSE BLD-MCNC: 117 MG/DL (ref 70–99)
HCT VFR BLD AUTO: 38.6 %
HGB BLD-MCNC: 12.7 G/DL
IMM GRANULOCYTES # BLD AUTO: 0.17 X10(3) UL (ref 0–1)
IMM GRANULOCYTES NFR BLD: 1.7 %
LYMPHOCYTES # BLD AUTO: 3.58 X10(3) UL (ref 1–4)
LYMPHOCYTES NFR BLD AUTO: 34.8 %
MCH RBC QN AUTO: 30.4 PG (ref 26–34)
MCHC RBC AUTO-ENTMCNC: 32.9 G/DL (ref 31–37)
MCV RBC AUTO: 92.3 FL
MONOCYTES # BLD AUTO: 0.65 X10(3) UL (ref 0.1–1)
MONOCYTES NFR BLD AUTO: 6.3 %
NEUTROPHILS # BLD AUTO: 5.7 X10 (3) UL (ref 1.5–7.7)
NEUTROPHILS # BLD AUTO: 5.7 X10(3) UL (ref 1.5–7.7)
NEUTROPHILS NFR BLD AUTO: 55.2 %
OSMOLALITY SERPL CALC.SUM OF ELEC: 291 MOSM/KG (ref 275–295)
PLATELET # BLD AUTO: 221 10(3)UL (ref 150–450)
POTASSIUM SERPL-SCNC: 3.9 MMOL/L (ref 3.5–5.1)
PROT SERPL-MCNC: 6.2 G/DL (ref 6.4–8.2)
RBC # BLD AUTO: 4.18 X10(6)UL
SODIUM SERPL-SCNC: 138 MMOL/L (ref 136–145)
TROPONIN I HIGH SENSITIVITY: 10 NG/L
WBC # BLD AUTO: 10.3 X10(3) UL (ref 4–11)

## 2022-04-06 PROCEDURE — 84484 ASSAY OF TROPONIN QUANT: CPT | Performed by: EMERGENCY MEDICINE

## 2022-04-06 PROCEDURE — 99285 EMERGENCY DEPT VISIT HI MDM: CPT

## 2022-04-06 PROCEDURE — 36415 COLL VENOUS BLD VENIPUNCTURE: CPT

## 2022-04-06 PROCEDURE — 93005 ELECTROCARDIOGRAM TRACING: CPT

## 2022-04-06 PROCEDURE — 93010 ELECTROCARDIOGRAM REPORT: CPT

## 2022-04-06 PROCEDURE — 70450 CT HEAD/BRAIN W/O DYE: CPT | Performed by: EMERGENCY MEDICINE

## 2022-04-06 PROCEDURE — 72125 CT NECK SPINE W/O DYE: CPT | Performed by: EMERGENCY MEDICINE

## 2022-04-06 PROCEDURE — 71045 X-RAY EXAM CHEST 1 VIEW: CPT | Performed by: EMERGENCY MEDICINE

## 2022-04-06 PROCEDURE — 80053 COMPREHEN METABOLIC PANEL: CPT | Performed by: EMERGENCY MEDICINE

## 2022-04-06 PROCEDURE — 99284 EMERGENCY DEPT VISIT MOD MDM: CPT

## 2022-04-06 PROCEDURE — 85025 COMPLETE CBC W/AUTO DIFF WBC: CPT | Performed by: EMERGENCY MEDICINE

## 2022-04-07 VITALS
OXYGEN SATURATION: 95 % | HEART RATE: 88 BPM | SYSTOLIC BLOOD PRESSURE: 154 MMHG | RESPIRATION RATE: 18 BRPM | DIASTOLIC BLOOD PRESSURE: 76 MMHG | TEMPERATURE: 98 F

## 2022-04-07 LAB
ATRIAL RATE: 87 BPM
ATRIAL RATE: 89 BPM
BILIRUB UR QL STRIP.AUTO: NEGATIVE
CLARITY UR REFRACT.AUTO: CLEAR
GLUCOSE UR STRIP.AUTO-MCNC: NEGATIVE MG/DL
KETONES UR STRIP.AUTO-MCNC: NEGATIVE MG/DL
LEUKOCYTE ESTERASE UR QL STRIP.AUTO: NEGATIVE
NITRITE UR QL STRIP.AUTO: NEGATIVE
P AXIS: 39 DEGREES
P AXIS: 64 DEGREES
P-R INTERVAL: 180 MS
P-R INTERVAL: 188 MS
PH UR STRIP.AUTO: 8 [PH] (ref 5–8)
PROT UR STRIP.AUTO-MCNC: NEGATIVE MG/DL
Q-T INTERVAL: 376 MS
Q-T INTERVAL: 392 MS
QRS DURATION: 78 MS
QRS DURATION: 84 MS
QTC CALCULATION (BEZET): 457 MS
QTC CALCULATION (BEZET): 471 MS
R AXIS: 10 DEGREES
R AXIS: 34 DEGREES
RBC UR QL AUTO: NEGATIVE
SP GR UR STRIP.AUTO: 1 (ref 1–1.03)
T AXIS: 36 DEGREES
T AXIS: 47 DEGREES
UROBILINOGEN UR STRIP.AUTO-MCNC: <2 MG/DL
VENTRICULAR RATE: 87 BPM
VENTRICULAR RATE: 89 BPM

## 2022-04-07 PROCEDURE — 93005 ELECTROCARDIOGRAM TRACING: CPT

## 2022-04-07 PROCEDURE — 81003 URINALYSIS AUTO W/O SCOPE: CPT | Performed by: EMERGENCY MEDICINE

## 2022-04-07 NOTE — ED INITIAL ASSESSMENT (HPI)
Patient presents post fall from [facility]. Patient reports she was in a narrow hallway and reaching up and someone else was reaching down; she gets confused while recalling the events. She arrives in a collar placed by EMS per protocol. Complains of shoulder pain and rib pain, denies lower extremity pain.

## 2022-05-08 ENCOUNTER — HOSPITAL ENCOUNTER (EMERGENCY)
Facility: HOSPITAL | Age: 87
Discharge: HOME | End: 2022-05-08
Attending: EMERGENCY MEDICINE
Payer: MEDICARE

## 2022-05-08 ENCOUNTER — APPOINTMENT (OUTPATIENT)
Dept: CT IMAGING | Facility: HOSPITAL | Age: 87
End: 2022-05-08
Attending: EMERGENCY MEDICINE
Payer: MEDICARE

## 2022-05-08 VITALS
DIASTOLIC BLOOD PRESSURE: 76 MMHG | WEIGHT: 121.25 LBS | TEMPERATURE: 97 F | RESPIRATION RATE: 18 BRPM | SYSTOLIC BLOOD PRESSURE: 174 MMHG | HEART RATE: 84 BPM | BODY MASS INDEX: 22 KG/M2

## 2022-05-08 DIAGNOSIS — S09.90XA INJURY OF HEAD, INITIAL ENCOUNTER: Primary | ICD-10-CM

## 2022-05-08 PROCEDURE — 99284 EMERGENCY DEPT VISIT MOD MDM: CPT

## 2022-05-08 PROCEDURE — 70450 CT HEAD/BRAIN W/O DYE: CPT | Performed by: EMERGENCY MEDICINE

## 2022-05-08 PROCEDURE — 72125 CT NECK SPINE W/O DYE: CPT | Performed by: EMERGENCY MEDICINE

## 2022-05-08 RX ORDER — CETIRIZINE HYDROCHLORIDE 5 MG/1
5 TABLET ORAL DAILY
COMMUNITY

## 2022-05-08 RX ORDER — FUROSEMIDE 20 MG/1
20 TABLET ORAL 2 TIMES DAILY
COMMUNITY

## 2022-05-08 RX ORDER — ALENDRONATE SODIUM 70 MG/1
70 TABLET ORAL
COMMUNITY

## 2022-05-08 RX ORDER — POTASSIUM CHLORIDE 750 MG/1
10 TABLET, EXTENDED RELEASE ORAL 2 TIMES DAILY
COMMUNITY

## 2022-05-08 NOTE — ED INITIAL ASSESSMENT (HPI)
Pt presents to ER status post fall this morning, 6hrs ago. Pt stood up from sofa and fell backward hitting head on the floor. Sent to ER for CT scan. Pt states no complaints at this time. Pt does not remember fall. Pt is alert to self and situation, confused to date, per her normal. Respirations equal and nonlabored. Per nursing home, pt fell yesterday also.

## 2022-05-08 NOTE — CM/SW NOTE
Per family, pt is resident at [facility] in [facility]. Family is requesting call from hospice liaison on hospice information. Pt is discharging now back to [facility]. You can call Maximus TORRES-[phone]. CM sent email to [facility] with above information for f/u to hospice [facility] liaison. CM to remain available for support and/or discharge planning.     Jaime Marion RN, [facility]  ER   Ext. [phone]

## 2022-05-09 ENCOUNTER — LAB REQUISITION (OUTPATIENT)
Dept: LAB | Facility: HOSPITAL | Age: 87
End: 2022-05-09
Payer: MEDICARE

## 2022-05-09 LAB
BILIRUB UR QL STRIP.AUTO: NEGATIVE
COLOR UR AUTO: YELLOW
GLUCOSE UR STRIP.AUTO-MCNC: NEGATIVE MG/DL
KETONES UR STRIP.AUTO-MCNC: NEGATIVE MG/DL
NITRITE UR QL STRIP.AUTO: NEGATIVE
PH UR STRIP.AUTO: 6 [PH] (ref 5–8)
PROT UR STRIP.AUTO-MCNC: NEGATIVE MG/DL
RBC #/AREA URNS AUTO: >10 /HPF
SP GR UR STRIP.AUTO: 1.02 (ref 1–1.03)
UROBILINOGEN UR STRIP.AUTO-MCNC: <2 MG/DL
WBC #/AREA URNS AUTO: >50 /HPF

## 2022-05-09 PROCEDURE — 87077 CULTURE AEROBIC IDENTIFY: CPT | Performed by: INTERNAL MEDICINE

## 2022-05-09 PROCEDURE — 87186 SC STD MICRODIL/AGAR DIL: CPT | Performed by: INTERNAL MEDICINE

## 2022-05-09 PROCEDURE — 87086 URINE CULTURE/COLONY COUNT: CPT | Performed by: INTERNAL MEDICINE

## 2022-05-09 PROCEDURE — 81001 URINALYSIS AUTO W/SCOPE: CPT | Performed by: INTERNAL MEDICINE

## 2022-05-09 NOTE — PROGRESS NOTES
Per Karen Lazo, a family can use any hospice company they choose. Preeti does recommend/work with [employer] and [employer]. Called patients daughter, [facility], [phone]. Answered her questions, gave her the hospice names provided by [facility] and a few other names of hospice companies. Daughter very appreciative of information. She is waiting on a return call from [facility] to get the process started.

## 2022-06-07 ENCOUNTER — LAB REQUISITION (OUTPATIENT)
Dept: LAB | Facility: HOSPITAL | Age: 87
End: 2022-06-07
Payer: MEDICARE

## 2022-06-07 DIAGNOSIS — I25.2 OLD MYOCARDIAL INFARCTION: ICD-10-CM

## 2022-06-07 DIAGNOSIS — I10 ESSENTIAL (PRIMARY) HYPERTENSION: ICD-10-CM

## 2022-06-07 DIAGNOSIS — G30.9 ALZHEIMER'S DISEASE, UNSPECIFIED (CODE) (HCC): ICD-10-CM

## 2022-06-07 DIAGNOSIS — M19.90 UNSPECIFIED OSTEOARTHRITIS, UNSPECIFIED SITE: ICD-10-CM

## 2022-06-07 LAB
BILIRUB UR QL STRIP.AUTO: NEGATIVE
COLOR UR AUTO: YELLOW
GLUCOSE UR STRIP.AUTO-MCNC: NEGATIVE MG/DL
KETONES UR STRIP.AUTO-MCNC: NEGATIVE MG/DL
NITRITE UR QL STRIP.AUTO: POSITIVE
PH UR STRIP.AUTO: 6 [PH] (ref 5–8)
PROT UR STRIP.AUTO-MCNC: NEGATIVE MG/DL
SP GR UR STRIP.AUTO: 1.01 (ref 1–1.03)
UROBILINOGEN UR STRIP.AUTO-MCNC: <2 MG/DL
WBC #/AREA URNS AUTO: >50 /HPF
WBC CLUMPS UR QL AUTO: PRESENT /HPF

## 2022-06-07 PROCEDURE — 81001 URINALYSIS AUTO W/SCOPE: CPT | Performed by: INTERNAL MEDICINE

## 2022-06-07 PROCEDURE — 87186 SC STD MICRODIL/AGAR DIL: CPT | Performed by: INTERNAL MEDICINE

## 2022-06-07 PROCEDURE — 87077 CULTURE AEROBIC IDENTIFY: CPT | Performed by: INTERNAL MEDICINE

## 2022-06-07 PROCEDURE — 87086 URINE CULTURE/COLONY COUNT: CPT | Performed by: INTERNAL MEDICINE

## 2022-08-29 ENCOUNTER — APPOINTMENT (OUTPATIENT)
Dept: GENERAL RADIOLOGY | Facility: HOSPITAL | Age: 87
End: 2022-08-29
Attending: EMERGENCY MEDICINE
Payer: MEDICARE

## 2022-08-29 ENCOUNTER — HOSPITAL ENCOUNTER (EMERGENCY)
Facility: HOSPITAL | Age: 87
Discharge: HOME | End: 2022-08-29
Attending: EMERGENCY MEDICINE
Payer: MEDICARE

## 2022-08-29 ENCOUNTER — APPOINTMENT (OUTPATIENT)
Dept: CT IMAGING | Facility: HOSPITAL | Age: 87
End: 2022-08-29
Attending: EMERGENCY MEDICINE
Payer: MEDICARE

## 2022-08-29 VITALS
HEART RATE: 86 BPM | TEMPERATURE: 98 F | WEIGHT: 125 LBS | RESPIRATION RATE: 20 BRPM | DIASTOLIC BLOOD PRESSURE: 86 MMHG | HEIGHT: 62 IN | SYSTOLIC BLOOD PRESSURE: 152 MMHG | OXYGEN SATURATION: 96 % | BODY MASS INDEX: 23 KG/M2
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
## 2022-08-29 LAB
ALBUMIN SERPL-MCNC: 3.3 G/DL (ref 3.4–5)
ALBUMIN/GLOB SERPL: 1 {RATIO} (ref 1–2)
ALP LIVER SERPL-CCNC: 118 U/L
ALT SERPL-CCNC: 17 U/L
ANION GAP SERPL CALC-SCNC: 6 MMOL/L (ref 0–18)
AST SERPL-CCNC: 19 U/L (ref 15–37)
BASOPHILS # BLD AUTO: 0.05 X10(3) UL (ref 0–0.2)
BASOPHILS NFR BLD AUTO: 0.5 %
BILIRUB SERPL-MCNC: 0.5 MG/DL (ref 0.1–2)
BILIRUB UR QL STRIP.AUTO: NEGATIVE
BUN BLD-MCNC: 18 MG/DL (ref 7–18)
CALCIUM BLD-MCNC: 9.2 MG/DL (ref 8.5–10.1)
CHLORIDE SERPL-SCNC: 105 MMOL/L (ref 98–112)
CLARITY UR REFRACT.AUTO: CLEAR
CO2 SERPL-SCNC: 29 MMOL/L (ref 21–32)
COLOR UR AUTO: YELLOW
CREAT BLD-MCNC: 0.91 MG/DL
EOSINOPHIL # BLD AUTO: 0.19 X10(3) UL (ref 0–0.7)
EOSINOPHIL NFR BLD AUTO: 1.9 %
ERYTHROCYTE [DISTWIDTH] IN BLOOD BY AUTOMATED COUNT: 13.1 %
GFR SERPLBLD BASED ON 1.73 SQ M-ARVRAT: 58 ML/MIN/1.73M2 (ref 60–?)
GLOBULIN PLAS-MCNC: 3.3 G/DL (ref 2.8–4.4)
GLUCOSE BLD-MCNC: 109 MG/DL (ref 70–99)
GLUCOSE UR STRIP.AUTO-MCNC: NEGATIVE MG/DL
HCT VFR BLD AUTO: 42.6 %
HGB BLD-MCNC: 13.9 G/DL
IMM GRANULOCYTES # BLD AUTO: 0.07 X10(3) UL (ref 0–1)
IMM GRANULOCYTES NFR BLD: 0.7 %
KETONES UR STRIP.AUTO-MCNC: NEGATIVE MG/DL
LEUKOCYTE ESTERASE UR QL STRIP.AUTO: NEGATIVE
LYMPHOCYTES # BLD AUTO: 3.63 X10(3) UL (ref 1–4)
LYMPHOCYTES NFR BLD AUTO: 35.6 %
MCH RBC QN AUTO: 29.5 PG (ref 26–34)
MCHC RBC AUTO-ENTMCNC: 32.6 G/DL (ref 31–37)
MCV RBC AUTO: 90.4 FL
MONOCYTES # BLD AUTO: 0.81 X10(3) UL (ref 0.1–1)
MONOCYTES NFR BLD AUTO: 7.9 %
NEUTROPHILS # BLD AUTO: 5.46 X10 (3) UL (ref 1.5–7.7)
NEUTROPHILS # BLD AUTO: 5.46 X10(3) UL (ref 1.5–7.7)
NEUTROPHILS NFR BLD AUTO: 53.4 %
NITRITE UR QL STRIP.AUTO: NEGATIVE
OSMOLALITY SERPL CALC.SUM OF ELEC: 292 MOSM/KG (ref 275–295)
PH UR STRIP.AUTO: 6 [PH] (ref 5–8)
PLATELET # BLD AUTO: 310 10(3)UL (ref 150–450)
POTASSIUM SERPL-SCNC: 3.7 MMOL/L (ref 3.5–5.1)
PROT SERPL-MCNC: 6.6 G/DL (ref 6.4–8.2)
PROT UR STRIP.AUTO-MCNC: NEGATIVE MG/DL
RBC # BLD AUTO: 4.71 X10(6)UL
RBC UR QL AUTO: NEGATIVE
SODIUM SERPL-SCNC: 140 MMOL/L (ref 136–145)
SP GR UR STRIP.AUTO: 1.01 (ref 1–1.03)
UROBILINOGEN UR STRIP.AUTO-MCNC: 0.2 MG/DL
WBC # BLD AUTO: 10.2 X10(3) UL (ref 4–11)

## 2022-08-29 PROCEDURE — 36415 COLL VENOUS BLD VENIPUNCTURE: CPT

## 2022-08-29 PROCEDURE — 99284 EMERGENCY DEPT VISIT MOD MDM: CPT

## 2022-08-29 PROCEDURE — 85025 COMPLETE CBC W/AUTO DIFF WBC: CPT | Performed by: EMERGENCY MEDICINE

## 2022-08-29 PROCEDURE — 70450 CT HEAD/BRAIN W/O DYE: CPT | Performed by: EMERGENCY MEDICINE

## 2022-08-29 PROCEDURE — 81003 URINALYSIS AUTO W/O SCOPE: CPT | Performed by: EMERGENCY MEDICINE

## 2022-08-29 PROCEDURE — 80053 COMPREHEN METABOLIC PANEL: CPT | Performed by: EMERGENCY MEDICINE

## 2022-08-29 PROCEDURE — 73562 X-RAY EXAM OF KNEE 3: CPT | Performed by: EMERGENCY MEDICINE

## 2022-08-29 PROCEDURE — 71045 X-RAY EXAM CHEST 1 VIEW: CPT | Performed by: EMERGENCY MEDICINE

## 2022-08-29 NOTE — ED INITIAL ASSESSMENT (HPI)
Pt states that she was getting up out of bed and she fell. Thinks she hit her right hip/flank area. Denies LOC or hitting her head.
No

## 2023-06-08 NOTE — ED NOTES
Pt and family informed of f/u and d/c instructions, pt iv removed area intact, pt ready for dc home.
Pt labs collected and sent, , pt daughter at bed side.
Pt return to  from ct-scan, resting on the cart.
Pt sitting on cart, alert- skin p/w/d, breathing non labored and in no distress. Vitals obtained, Dr Garett Ny made aware of BP- improved from prior.  [id number] Jessika Ford for DC
.
throat

## 2024-11-25 NOTE — TELEPHONE ENCOUNTER
Patient advised Lasix 20 md sent to [facility], take x 1wk and callback with update on B leg edema, patient verbalized understanding 04-Jun-2023 23:38 (2) Psych/Behavioral Disorders

## (undated) DIAGNOSIS — M19.90 UNSPECIFIED OSTEOARTHRITIS, UNSPECIFIED SITE: ICD-10-CM

## (undated) DIAGNOSIS — G30.9 ALZHEIMER'S DISEASE, UNSPECIFIED (CODE) (HCC): ICD-10-CM

## (undated) DIAGNOSIS — I25.2 OLD MYOCARDIAL INFARCTION: ICD-10-CM

## (undated) DIAGNOSIS — I10 ESSENTIAL (PRIMARY) HYPERTENSION: ICD-10-CM

## (undated) NOTE — ED AVS SNAPSHOT
Aditya Dinero   MRN: [id number]    Department:  [facility] Emergency Department   Date of Visit:  10/8/2017           Disclosure     Insurance plans vary and the physician(s) referred by the ER may not be covered by your plan.  Please contact you If you have been prescribed any medication(s), please fill your prescription right away and begin taking the medication(s) as directed    If the emergency physician has read X-rays, these will be re-interpreted by a radiologist.  If there is a significant

## (undated) NOTE — IP AVS SNAPSHOT
Patient Demographics     Address  [place] [address] Phone  [phone] [facility]  [phone] [facility]      Emergency Contact(s)     Name Relation Home Work Algorta Son [phone] [address] [id number] [id number] Losartan Potassium (COZAAR) tab 50 mg 12/06/20 2027 Given      [id number] acetaminophen (TYLENOL) tab 650 mg 12/06/20 2027 Given      [id number] docusate sodium (COLACE) cap 100 mg 12/07/20 1403 Given      [id number] metoprolol succinate (Toprol XL  1928 MRN [id number]   Location [address] [place] [address] 2NE-A Attending Mau Buckner MD   Hosp Day # 0 PCP Severa Boop, MD     Chief Complaint:[MJ.1] Syncope and collapse[MJ.2]    History of Present Illness:  Aditya Dinero is a 80year old female adm MI    • Diabetes Neg    • Glaucoma Neg    • Macular degeneration Neg[MJ. 3]       Reviewed    Social history:   reports that she has never smoked.  She has never used smokeless tobacco. She reports current alcohol use of about 2.0 standard drinks of alc Component Value Date    WBC 10.2 12/04/2020    HGB 13.5 12/04/2020    HCT 41.7 12/04/2020    .0 12/04/2020    CREATSERUM 1.01 12/04/2020    BUN 22 12/04/2020     12/04/2020    K 4.0 12/04/2020     12/04/2020    CO2 27.0 12/04/2020    GLU FINDINGS:  The lungs are hyperinflated. Heart size and vascularity are normal.  No focal infiltrate, consolidation or pleural effusion. There is no pneumothorax. Atheromatous plaque is identified within the aorta.      No acute osseous abnormality is denisa H&P signed by Meseret Richards MD at 12/4/2020  8:05 PM  Version 2 of 3    Author: Meesret Richards MD Service: Hospitalist Author Type: Physician    Filed: 12/4/2020  8:05 PM Date of Service: 12/4/2020  5:21 PM Status: Addendum    : Meseret Richards MD (P PCI x 2   • Muscle weakness    • Osteoarthritis     bilat knees   • Retinal tear 1983    per ng OD retinal tear repair with Dr. Shereen Nelson at [facility]       Past Surgical History:   Procedure Laterality Date   • ANGIOPLASTY (CORONARY)  2012    PCI x 2   • CATARACT Neck: No lymphadenopathy. No JVD. No carotid bruits. Respiratory: Clear to auscultation bilaterally. No wheezes. No rhonchi. Cardiovascular: S1, S2.  Regular rate and rhythm. No murmurs. Equal pulses   Abdomen: Soft, nontender, nondistended.   Positive CONCLUSION:  Compression fracture deformities of L1 and L2. Correlate clinically for age of these findings. MRI may aid in further evaluation if indicated clinically.         Dictated by (CST): Tania Donovan MD on 12/04/2020 at 12:26 PM          XR C Plan of care discussed with[MJ.1] patient[MJ. 2]      Discussed with ER Physician. [MJ.1]  Discussed with family at below number  Luis Antonio Varela Son at  [phone][MJ.4]         Nini Minor MD  12/4/2020[MJ.1]        Electronically signed by Emma Rebollar MD Preeti Camarillo is a 80year old female admitted with[MJ.1] syncope and collapse. 1 episode today. Patient states she lives alone. Patient states she had eaten breakfast earlier and was standing waiting her mail in her kitchen.   Next thing she remembers reports that she has never smoked. She has never used smokeless tobacco. She reports current alcohol use of about 2.0 standard drinks of alcohol per week. She reports that she does not use drugs.     Allergies:    Atorvastatin            MYALGIA  Sulfa Ant CREATSERUM 1.01 12/04/2020    BUN 22 12/04/2020     12/04/2020    K 4.0 12/04/2020     12/04/2020    CO2 27.0 12/04/2020     12/04/2020    CA 9.1 12/04/2020    ALB 3.4 12/04/2020    ALKPHO 88 12/04/2020    BILT 0.4 12/04/2020    TP 6.4 Filed: 12/4/2020 10:05 PM Date of Service: 12/4/2020  9:51 PM Status: Signed    : Marietta Rutherford MD (Physician)     Consult Orders    1.  Consult to Cardiology [[id number]] ordered by Alfonso Villela MD at 12/04/20 6 [facility] Card[facility] • CATARACT      bilateral   • CATARACT EXTRACTION W/  INTRAOCULAR LENS IMPLANT Right 6/5/00    RJM   • CATARACT EXTRACTION W/  INTRAOCULAR LENS IMPLANT Left 6/30/02    RJM   • TONSILLECTOMY[PF.2]          Social History:[PF.1] Social History    Tobacco Use Abdomen: soft, non-tender; bowel sounds normal; no masses,  no organomegaly  Extremities: No edema, poor skin turgor, skin is dry.   Neurologic: Grossly normal      ASSESSMENT/PLAN:    · Fall with injury–the patient suffered a significant lapse of awareness : Wm Quinones PT (Physical Therapist)        PHYSICAL THERAPY TREATMENT NOTE - INPATIENT    Room Number: 3625/9932-L     Session: 1   Number of Visits to Meet Established Goals: 3    Presenting Problem: fall, back pain    Problem List  Principal How much difficulty does the patient currently have. ..  -   Turning over in bed (including adjusting bedclothes, sheets and blankets)?: A Little   -   Sitting down on and standing up from a chair with arms (e.g., wheelchair, bedside commode, etc.): A Tremainetl PT Treatment Plan: Bed mobility; Patient education;Gait training;Range of motion;Strengthening;Transfer training  Rehab Potential : Good  Frequency (Obs): 5x/week    CURRENT GOALS[DM. 1]      Goal #1 Patient is able to demonstrate supine - sit EOB @ level: c HTN (hypertension)    Pure hypercholesterolemia    Back pain without sciatica    Closed wedge compression fracture of L1 vertebra, initial encounter ([facility])[AZ.2]      Past Medical History[AZ.1]  Past Medical History:   Diagnosis Date   • Cataract    • Kobe Lower extremity strength is within functional limits[AZ.1]; B LE strength not formally tested against resistance, B LE at least 3+/5[AZ. 3]    BALANCE[AZ.1]  Static Sitting: Good  Dynamic Sitting: Good  Static Standing: Fair +  Dynamic Standing: Fair +[AZ. 2 Skilled Therapy Provided:[AZ.1] Received patient sitting up in the recliner chair, patient performed sit<>stand cga-SBA with the RW, patient ambulated inside the room cga, patient performed sit->supine via log roll technique needing mod assist to GLORIA Scales Patient is a[AZ.3 80year old[AZ.2] female admitted on 12/4/2020 for[AZ.1] fall & back pain[AZ.5] . Pertinent comorbidities and personal factors impacting therapy include[AZ.1] syncope, CAD, HTN[AZ.5].   In this PT evaluation, the patient presents with th Attribution Mendez    AZ. 1 - Brandon Hoover, PT on 12/5/2020 10:23 AM  AZ. 2 - Brandon Hoover, PT on 12/5/2020 10:43 AM  AZ. 3 - Brandon Hoover, PT on 12/5/2020  1:15 PM  AZ. 4 - Brandon Hoover, PT on 12/5/2020 10:30 AM  AZ. 5 - Brandon Hoover, PT Past Surgical History:   Procedure Laterality Date   • ANGIOPLASTY (CORONARY)  2012    PCI x 2   • CATARACT      bilateral   • CATARACT EXTRACTION W/  INTRAOCULAR LENS IMPLANT Right 6/5/00    RJM   • CATARACT EXTRACTION W/  INTRAOCULAR LENS IMPLANT Left 6/ -   Turning over in bed (including adjusting bedclothes, sheets and blankets)?: A Little   -   Sitting down on and standing up from a chair with arms (e.g., wheelchair, bedside commode, etc.): A Little   -   Moving from lying on back to sitting on the side Discussed goals & POC with patient who is in agreement. Patient was educated at length on avoiding bending/twisting motions during functional activities/ADL and emphasized (-)lifting at home.   Patient was educated also on using the grabber at home to [place] These impairments and comorbidities manifest themselves as functional limitations in independent bed mobility, transfers, and gait.   The patient is below baseline and would benefit from skilled inpatient PT to address the above deficits to assist patient i Pneumococcal (Prevnar 13) 05/07/15     Pneumovax 23 11/30/18     Pneumovax 23 05/07/00       Multidisciplinary Problems     Active Goals        Problem: Patient/Family Goals    Goal Priority Disciplines Outcome Interventions   Patient/Family [facility]

## (undated) NOTE — MR AVS SNAPSHOT
[place]  [address] [facility]  [address] [facility] [address] [phone]               Thank you for choosing us for your health care visit with Marek Hermosillo MD.  We are glad to serve you and happy to provide you with this summ Referral Details     Referred By    Referred To    Serafin Orosco MD   [address]   [place] [address]   Phone:  [phone]   Fax:  [phone]    Diagnoses:  Chronic pain of both knees   Encounter for annual health examination PH: [phone]    [address]: [address] [facility] and   [address] [place] [address], [address] [place] [address]    [address]: 524[address]24 Encounter for annual health examination [Z00.00], Depression screening [Z13.89]          Reason for Today's Visit     CPX           Medical Issues Discussed Today     Chronic constipation    Coronary artery disease    Essential hypertension    Macular puck ? Install grab bars on the bathroom walls beside the tub, shower and toilet. ? Use a non skid rubber mat in the tub/shower. ? If you are unsteady on your feet you may want to use a shower chair/bench and a hand held shower head while bathing/showering. What changed:  when to take this   Commonly known as:  HYGROTEN           Losartan Potassium 25 MG Tabs   Take 25 mg by mouth daily. What changed:  Another medication with the same name was removed.  Continue taking this medication, and follow the directi ? If you have pets, be careful that you don’t trip over them. OUTSIDE SAFETY TIPS  ? Always wear good shoes with proper support and traction. ? Always use hand rails on stairs and escalators. ? Cover porch steps with gritty weather proof paint.   ? Pay

## (undated) NOTE — IP AVS SNAPSHOT
[address]            (For Outpatient Use Only) Initial Admit Date: 12/4/2020   Inpt/Obs Admit Date: Inpt: N/A / Obs: 12/04/20   Discharge Date:    Renita Akbar:  [de-identified]   MRN: [de-identified]   CSN: [id number]   CEID: [id number] [employer]ERT[employer]ARY INSURANCE   Payor:  Plan:    Group Number:  Insurance Type:    Subscriber Name:  Subscriber :    Subscriber ID:  Pt Rel to Subscriber:    Hospital Account Financial Class: Medicare    2020

## (undated) NOTE — ED AVS SNAPSHOT
Clydene Brittle   MRN: [id number]    Department:  [facility] Emergency Department   Date of Visit:  7/9/2019           Disclosure     Insurance plans vary and the physician(s) referred by the ER may not be covered by your plan.  Please contact your tell this physician (or your personal doctor if your instructions are to return to your personal doctor) about any new or lasting problems. The primary care or specialist physician will see patients referred from the [facility] Emergency Department.  Jose Kimball

## (undated) NOTE — LETTER
5/7/2019    Patient: Eduardo Jean Baptiste   MR Number: [id number]   YOB: 1928   Date of Visit: 5/7/2019   Physician: Trini Gallego MD     Dear Medicare Patient:  Sissy Pod TO BENEFICIARY:  Please know that while a refraction is impo

## (undated) NOTE — ED AVS SNAPSHOT
Edy Izquierdo   MRN: [id number]    Department:  [facility] Emergency Department   Date of Visit:  7/31/2019           Disclosure     Insurance plans vary and the physician(s) referred by the ER may not be covered by your plan.  Please contact you tell this physician (or your personal doctor if your instructions are to return to your personal doctor) about any new or lasting problems. The primary care or specialist physician will see patients referred from the [facility] Emergency Department.  Radha Carson